# Patient Record
Sex: MALE | Race: WHITE | NOT HISPANIC OR LATINO | URBAN - METROPOLITAN AREA
[De-identification: names, ages, dates, MRNs, and addresses within clinical notes are randomized per-mention and may not be internally consistent; named-entity substitution may affect disease eponyms.]

---
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
## 2025-02-28 ENCOUNTER — OFFICE VISIT (OUTPATIENT)
Dept: FAMILY MEDICINE | Facility: CLINIC | Age: 25
End: 2025-02-28
Payer: OTHER GOVERNMENT

## 2025-02-28 VITALS
DIASTOLIC BLOOD PRESSURE: 84 MMHG | HEIGHT: 76 IN | HEART RATE: 92 BPM | SYSTOLIC BLOOD PRESSURE: 150 MMHG | WEIGHT: 218 LBS | BODY MASS INDEX: 26.55 KG/M2 | OXYGEN SATURATION: 99 % | TEMPERATURE: 99 F

## 2025-02-28 DIAGNOSIS — Z76.89 ENCOUNTER TO ESTABLISH CARE: Primary | ICD-10-CM

## 2025-02-28 DIAGNOSIS — I10 PRIMARY HYPERTENSION: ICD-10-CM

## 2025-02-28 DIAGNOSIS — F41.9 ANXIETY: ICD-10-CM

## 2025-02-28 PROCEDURE — 99203 OFFICE O/P NEW LOW 30 MIN: CPT | Mod: PBBFAC,PN

## 2025-02-28 PROCEDURE — 99999 PR PBB SHADOW E&M-NEW PATIENT-LVL III: CPT | Mod: PBBFAC,,,

## 2025-02-28 RX ORDER — SERTRALINE HYDROCHLORIDE 25 MG/1
25 TABLET, FILM COATED ORAL DAILY
Qty: 30 TABLET | Refills: 1 | Status: SHIPPED | OUTPATIENT
Start: 2025-02-28

## 2025-02-28 RX ORDER — LISINOPRIL 10 MG/1
10 TABLET ORAL DAILY
COMMUNITY
End: 2025-02-28 | Stop reason: SINTOL

## 2025-02-28 RX ORDER — AMLODIPINE BESYLATE 5 MG/1
5 TABLET ORAL DAILY
Qty: 30 TABLET | Refills: 1 | Status: SHIPPED | OUTPATIENT
Start: 2025-02-28

## 2025-02-28 NOTE — PROGRESS NOTES
SUBJECTIVE:      Patient ID: Mc Vasques is a 24 y.o. male.    Chief Complaint: Follow-up (Bp follow up and Med Refill)    History of Present Illness    CHIEF COMPLAINT:  Mc presents to establish care and obtain refills for blood pressure medication.    HPI:  Mc reports a history of hypertension diagnosed last year. He was previously prescribed Lisinopril 10 mg daily but ran out in the first week of January and has not been taking it since. He has anxiety throughout the day, which he believes contributes to his high blood pressure. His anxiety worsens in when alone and improves when with familiar people. He was previously given Ativan 1 mg for anxiety but is not currently taking it. He had severe dry cough as a side effect of Lisinopril. He has made lifestyle changes to manage his blood pressure, including weight loss, diet plans, and using salt substitutes. He exercises regularly and avoids soda, limiting himself to 1 Powerade per day due to its high sodium content. He also avoids caffeine as it causes jitteriness.    He denies SI, HI, or SH. Reports that the anxiety does interfere with his daily life and would like to start medications. He currently is not in counseling. 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     He denies chest pain, chest tightness, shortness of breath, nausea, vomiting, diarrhea, loose stools, changes in bowel movements, difficulty urinating, joint pain, and muscle pain. He denies any medical history other than hypertension and anxiety.    MEDICATIONS:  Mc started Lisinopril 10 mg daily for hypertension, last taken in the first week of January. He experienced a side effect of severe dry cough, which led to the discontinuation of this medication. He was previously given Ativan 1 mg for anxiety but is not currently taking it.    MEDICAL HISTORY:  Mc has a history of hypertension, diagnosed last year, and anxiety.    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 These included a stress test, EKG, Holter monitor, and nuclear stress test, all of which yielded normal results. Echocardiogram: Last year, normal        Follow-up  Pertinent negatives include no abdominal pain, arthralgias, chest pain, chills, congestion, coughing, fatigue, fever, headaches, myalgias, nausea, rash, sore throat, vomiting or weakness.      Review of Systems   Constitutional:  Negative for appetite change, chills, fatigue, fever and unexpected weight change.   HENT:  Negative for congestion, ear pain and sore throat.    Eyes:  Negative for photophobia, pain and visual disturbance.   Respiratory:  Negative for cough, shortness of breath and wheezing.    Cardiovascular:  Negative for chest pain, palpitations and leg swelling.   Gastrointestinal:  Negative for abdominal pain, constipation, diarrhea, nausea and vomiting.   Endocrine: Negative for cold intolerance, heat intolerance and polydipsia.   Genitourinary:  Negative for difficulty urinating, dysuria and hematuria.   Musculoskeletal:  Negative for arthralgias and myalgias.   Skin:  Negative for rash.   Neurological:  Negative for dizziness, syncope, weakness and headaches.   Hematological:  Negative for adenopathy. Does not bruise/bleed easily.   Psychiatric/Behavioral:  Negative for dysphoric mood, self-injury and suicidal ideas. The patient is nervous/anxious.        No past medical history on file.  Current Medications[1]  Review of patient's allergies indicates:  No Known Allergies   No past surgical history on file.  Social History     Socioeconomic History    Marital status: Single     No family history on file.       OBJECTIVE:      Vitals:    02/28/25 0854   BP: (!) 150/84   BP Location: Right arm   Patient Position: Sitting   Pulse: 92  "  Temp: 98.9 °F (37.2 °C)   TempSrc: Oral   SpO2: 99%   Weight: 98.9 kg (218 lb)   Height: 6' 4" (1.93 m)     Physical Exam  Vitals and nursing note reviewed.   Constitutional:       General: He is not in acute distress.     Appearance: Normal appearance. He is well-developed. He is not ill-appearing or toxic-appearing.   HENT:      Head: Normocephalic and atraumatic.      Right Ear: Tympanic membrane, ear canal and external ear normal. There is no impacted cerumen.      Left Ear: Tympanic membrane, ear canal and external ear normal. There is no impacted cerumen.      Nose: Nose normal. No congestion or rhinorrhea.      Mouth/Throat:      Mouth: Mucous membranes are moist.      Pharynx: Oropharynx is clear. No oropharyngeal exudate or posterior oropharyngeal erythema.   Eyes:      General: No scleral icterus.        Right eye: No discharge.         Left eye: No discharge.      Extraocular Movements: Extraocular movements intact.      Conjunctiva/sclera: Conjunctivae normal.      Pupils: Pupils are equal, round, and reactive to light.   Neck:      Thyroid: No thyroid mass or thyromegaly.      Trachea: Trachea normal.   Cardiovascular:      Rate and Rhythm: Normal rate and regular rhythm.      Pulses: Normal pulses.      Heart sounds: Normal heart sounds. No murmur heard.  Pulmonary:      Effort: Pulmonary effort is normal. No respiratory distress.      Breath sounds: Normal breath sounds. No wheezing.   Musculoskeletal:         General: Normal range of motion.      Cervical back: Normal range of motion and neck supple. No tenderness.      Right lower leg: No edema.      Left lower leg: No edema.   Lymphadenopathy:      Cervical: No cervical adenopathy.   Skin:     General: Skin is warm and dry.      Coloration: Skin is not pale.      Findings: No erythema or rash.   Neurological:      Mental Status: He is alert and oriented to person, place, and time.   Psychiatric:         Mood and Affect: Mood is anxious.         " Behavior: Behavior normal.         Thought Content: Thought content does not include homicidal or suicidal ideation. Thought content does not include homicidal or suicidal plan.         Cognition and Memory: Cognition normal.         Judgment: Judgment normal.            Assessment:       1. Encounter to establish care    2. Primary hypertension    3. Anxiety        Plan:       Assessment & Plan    - Assessed 24-year-old patient with hypertension and anxiety  - Considered need to rule out renal causes of hypertension due to patient's age  - Evaluated need for anxiety medication; decided to start SSRI (Zoloft) at low dose  - Switched from Lisinopril to amlodipine due to patient's reported dry cough side effect  - Ordered comprehensive lab panel to evaluate overall health status and potential underlying causes of hypertension    PLAN SUMMARY:  - Discontinue Lisinopril due to adverse effects  - Prescribe amlodipine (calcium channel blocker) for hypertension management  - Prescribe Zoloft 25 mg daily for anxiety management  - Order renal arterial ultrasound to rule out kidney-related causes of hypertension  - Order labs: kidney function, liver function, anemia, thyroid function, cholesterol levels, and electrolytes  - Follow-up appointment in 1 month to evaluate anxiety medication effectiveness, check blood pressure, and review lab results  - Potential increase of Zoloft to 50 mg at follow-up if needed    I10 ESSENTIAL (PRIMARY) HYPERTENSION:  - Assessed the patient's blood pressure, which is elevated at 150/84.  - Noted the patient's history of hypertension diagnosed last year and current non-adherence to medication since early January.  - Explained potential causes of hypertension, including renal, cardiac, dietary, and psychosomatic factors.  - Discussed blood pressure goals (below 130/80).  - Prescribed amlodipine, a calcium channel blocker, to replace Lisinopril for hypertension management.  - Ordered a renal  arterial ultrasound to rule out kidney-related causes of hypertension due to the patient's young age.  - Planned labs to check kidney function, liver function, anemia, thyroid function, and cholesterol levels.  - Scheduled a follow-up visit in 1 month to check blood pressure and review lab results.    F41.9 ANXIETY DISORDER, UNSPECIFIED:  - Evaluated the patient's ongoing anxiety throughout the day, including social anxiety when going out.  - Informed the patient about the gradual onset of SSRI effects (2 weeks for initial effects, 4-6 weeks for full effect).  - Prescribed Zoloft 25 mg daily for anxiety management, with the possibility of increasing to 50 mg at follow-up if needed.  - Scheduled a follow-up visit in 1 month to evaluate the effectiveness of anxiety medication.  - Instructed the patient to contact the office immediately if experiencing suicidal thoughts, homicidal thoughts, or self-harm urges.         Encounter to establish care    Primary hypertension  -     CBC Auto Differential; Future; Expected date: 02/28/2025  -     Comprehensive Metabolic Panel; Future; Expected date: 02/28/2025  -     TSH; Future; Expected date: 02/28/2025  -     Lipid Panel; Future; Expected date: 02/28/2025  -     US Renal Artery Stenosis Hyperten (xpd); Future; Expected date: 02/28/2025  -     amLODIPine (NORVASC) 5 MG tablet; Take 1 tablet (5 mg total) by mouth once daily.  Dispense: 30 tablet; Refill: 1    Anxiety  -     CBC Auto Differential; Future; Expected date: 02/28/2025  -     Comprehensive Metabolic Panel; Future; Expected date: 02/28/2025  -     TSH; Future; Expected date: 02/28/2025  -     sertraline (ZOLOFT) 25 MG tablet; Take 1 tablet (25 mg total) by mouth once daily.  Dispense: 30 tablet; Refill: 1        Follow up in about 1 month (around 3/28/2025) for HTN, LABS, anxiety.      2/28/2025 DENNIS Myrick, RHIANNON  This note was generated with the assistance of ambient listening technology. Verbal consent  was obtained by the patient and accompanying visitor(s) for the recording of patient appointment to facilitate this note. I attest to having reviewed and edited the generated note for accuracy, though some syntax or spelling errors may persist. Please contact the author of this note for any clarification.              [1]   Current Outpatient Medications   Medication Sig Dispense Refill    amLODIPine (NORVASC) 5 MG tablet Take 1 tablet (5 mg total) by mouth once daily. 30 tablet 1    sertraline (ZOLOFT) 25 MG tablet Take 1 tablet (25 mg total) by mouth once daily. 30 tablet 1     No current facility-administered medications for this visit.

## 2025-03-17 ENCOUNTER — TELEPHONE (OUTPATIENT)
Dept: FAMILY MEDICINE | Facility: CLINIC | Age: 25
End: 2025-03-17
Payer: OTHER GOVERNMENT

## 2025-03-31 ENCOUNTER — OFFICE VISIT (OUTPATIENT)
Dept: FAMILY MEDICINE | Facility: CLINIC | Age: 25
End: 2025-03-31
Payer: OTHER GOVERNMENT

## 2025-03-31 VITALS
DIASTOLIC BLOOD PRESSURE: 88 MMHG | OXYGEN SATURATION: 98 % | TEMPERATURE: 99 F | WEIGHT: 226.88 LBS | SYSTOLIC BLOOD PRESSURE: 138 MMHG | HEART RATE: 97 BPM | BODY MASS INDEX: 27.61 KG/M2

## 2025-03-31 DIAGNOSIS — F41.9 ANXIETY: ICD-10-CM

## 2025-03-31 DIAGNOSIS — I10 PRIMARY HYPERTENSION: ICD-10-CM

## 2025-03-31 PROCEDURE — 99214 OFFICE O/P EST MOD 30 MIN: CPT | Mod: S$PBB,,,

## 2025-03-31 PROCEDURE — 99213 OFFICE O/P EST LOW 20 MIN: CPT | Mod: PBBFAC,PN

## 2025-03-31 PROCEDURE — 99999 PR PBB SHADOW E&M-EST. PATIENT-LVL III: CPT | Mod: PBBFAC,,,

## 2025-03-31 RX ORDER — AMLODIPINE BESYLATE 5 MG/1
5 TABLET ORAL DAILY
Qty: 90 TABLET | Refills: 3 | Status: SHIPPED | OUTPATIENT
Start: 2025-03-31

## 2025-03-31 RX ORDER — SERTRALINE HYDROCHLORIDE 25 MG/1
25 TABLET, FILM COATED ORAL DAILY
Qty: 90 TABLET | Refills: 3 | Status: SHIPPED | OUTPATIENT
Start: 2025-03-31

## 2025-03-31 NOTE — PROGRESS NOTES
SUBJECTIVE:      Patient ID: Mc Vasques is a 25 y.o. male.    Chief Complaint: Follow-up and Hypertension    History of Present Illness    CHIEF COMPLAINT:  Mc presents for a follow-up visit to discuss hypertension management and anxiety treatment.    HPI:  Mc presents today to follow up on hypertension and anxiety.  He reports improved blood pressure control with the current medication regimen. He has been monitoring his blood pressure regularly, but forgot to bring his list of readings to the appointment.  Blood pressure today was 138/88, last office visit was 150/84.  He is currently taking amlodipine 5 mg daily. Tolerating medications well and denies side effects.  Denies chest pain, chest tightness, SOB, orthopnea, peripheral edema or headaches. He has been adhering to lifestyle modifications, including watching his sodium intake and avoiding sodas.    Mc reports that his social anxiety is well-controlled on the current dose of Zoloft (25 mg daily), with no breakthrough anxiety.   Denies suicidal, homicidal, or self-harming thoughts.     Labs are pending to be completed along with a renal ultrasound.    MEDICATIONS:  Mc is on Amlodipine 5 mg daily for hypertension, which has been effective in lowering blood pressure without any reported side effects. He is also taking Zoloft (Sertraline) 25 mg daily for social anxiety, which has been effective in controlling anxiety symptoms without any reported side effects.    MEDICAL HISTORY:  Mc has a history of hypertension and social anxiety.    SOCIAL HISTORY:  Occupation: Works with nurses     History: Mc has  history        HPI   Review of Systems   Constitutional:  Negative for appetite change, chills, fatigue, fever and unexpected weight change.   HENT:  Negative for congestion, ear pain and sore throat.    Eyes:  Negative for photophobia, pain and visual disturbance.   Respiratory:  Negative for cough,  shortness of breath and wheezing.    Cardiovascular:  Negative for chest pain, palpitations and leg swelling.   Gastrointestinal:  Negative for abdominal pain, constipation, diarrhea, nausea and vomiting.   Endocrine: Negative for cold intolerance, heat intolerance and polydipsia.   Genitourinary:  Negative for difficulty urinating, dysuria and hematuria.   Musculoskeletal:  Negative for arthralgias and myalgias.   Skin:  Negative for rash.   Neurological:  Negative for dizziness, syncope, weakness and headaches.   Hematological:  Negative for adenopathy. Does not bruise/bleed easily.   Psychiatric/Behavioral:  Negative for dysphoric mood. The patient is not nervous/anxious.        No past medical history on file.  Current Medications[1]  Review of patient's allergies indicates:  No Known Allergies   No past surgical history on file.  Social History     Socioeconomic History    Marital status: Single   Tobacco Use    Smoking status: Former     Types: Cigarettes     No family history on file.       OBJECTIVE:      Vitals:    03/31/25 1255   BP: 138/88   BP Location: Left arm   Patient Position: Sitting   Pulse: 97   Temp: 98.7 °F (37.1 °C)   TempSrc: Oral   SpO2: 98%   Weight: 102.9 kg (226 lb 13.7 oz)     Physical Exam  Vitals and nursing note reviewed.   Constitutional:       General: He is not in acute distress.     Appearance: Normal appearance. He is well-developed. He is not ill-appearing or toxic-appearing.   HENT:      Head: Normocephalic and atraumatic.      Right Ear: External ear normal.      Left Ear: External ear normal.      Nose: Nose normal. No congestion or rhinorrhea.      Mouth/Throat:      Mouth: Mucous membranes are moist.      Pharynx: Oropharynx is clear. No oropharyngeal exudate or posterior oropharyngeal erythema.   Eyes:      General: No scleral icterus.        Right eye: No discharge.         Left eye: No discharge.      Extraocular Movements: Extraocular movements intact.       Conjunctiva/sclera: Conjunctivae normal.      Pupils: Pupils are equal, round, and reactive to light.   Neck:      Thyroid: No thyroid mass or thyromegaly.      Trachea: Trachea normal.   Cardiovascular:      Rate and Rhythm: Normal rate and regular rhythm.      Pulses: Normal pulses.      Heart sounds: Normal heart sounds. No murmur heard.  Pulmonary:      Effort: Pulmonary effort is normal. No respiratory distress.      Breath sounds: Normal breath sounds. No wheezing.   Musculoskeletal:         General: Normal range of motion.      Cervical back: Normal range of motion and neck supple. No tenderness.      Right lower leg: No edema.      Left lower leg: No edema.   Lymphadenopathy:      Cervical: No cervical adenopathy.   Skin:     General: Skin is warm and dry.      Coloration: Skin is not pale.      Findings: No erythema or rash.   Neurological:      Mental Status: He is alert and oriented to person, place, and time.   Psychiatric:         Attention and Perception: Attention normal.         Mood and Affect: Mood normal. Mood is not anxious.         Speech: Speech normal.         Behavior: Behavior normal. Behavior is cooperative.         Thought Content: Thought content normal. Thought content does not include homicidal or suicidal ideation. Thought content does not include homicidal or suicidal plan.         Cognition and Memory: Cognition normal.         Judgment: Judgment normal.            Assessment:       1. Primary hypertension    2. Anxiety        Plan:       Assessment & Plan    - HTN control improved to 138/88 with current amlodipine 5 mg daily regimen.  - Zoloft 25 mg daily effective for social anxiety control without breakthrough symptoms.  - Maintained current medication regimens for HTN and anxiety given positive response and absence of side effects.    PLAN SUMMARY:  - Maintain amlodipine 5 mg daily for hypertension  - Continue Zoloft 25 mg daily for anxiety  - renal ultrasound and fasting labs  are still pending  - Follow-up in 3 months for anxiety and hypertension, with potential to extend to 6 months if controlled    I10 ESSENTIAL (PRIMARY) HYPERTENSION:  - Measured the patient's blood pressure at 138/88, noting an improvement from previous readings.  - Evaluated the patient's tolerance to amlodipine 5 mg, checking for side effects such as chest pain, chest tightness, dyspnea, dizziness, and extremity edema.  - Assessed the patient's sodium intake and avoidance of sodas as part of hypertension management.  - Ordered renal ultrasound and fasting labs for further evaluation.  - Maintained the patient on amlodipine 5 mg daily for hypertension management.  - Advised against daily blood pressure checks unless experiencing symptoms like dizziness or headaches.  - Recommend continued monitoring of caffeine and sodium intake.  - Instructed the patient to contact the office when labs and renal ultrasound are completed.  - Scheduled follow up in 3 months for hypertension, with potential to extend to 6 months if controlled.    F41.1 GENERALIZED ANXIETY DISORDER:  - Inquired about the patient's social anxiety and overall anxiety control.  - Evaluated the patient's response to Zoloft 25 mg daily, checking for effectiveness and side effects.  - Maintained the patient on Zoloft 25 mg daily for anxiety management.  - Scheduled follow up in 3 months for anxiety, with potential to extend to 6 months if controlled.         Primary hypertension  -controlled  -continue amlodipine 5 mg daily  -continue limiting sodium and caffeine intake  -continue increasing physical exercise  -encouraged to get labs completed  -     amLODIPine (NORVASC) 5 MG tablet; Take 1 tablet (5 mg total) by mouth once daily.  Dispense: 90 tablet; Refill: 3    Anxiety  -controlled  -continue Zoloft 25 mg daily  -     sertraline (ZOLOFT) 25 MG tablet; Take 1 tablet (25 mg total) by mouth once daily.  Dispense: 90 tablet; Refill: 3        Follow up in  about 3 months (around 6/30/2025) for HTN and anxiety.      3/31/2025 DENNIS Myrick, RHIANNON  This note was generated with the assistance of ambient listening technology. Verbal consent was obtained by the patient and accompanying visitor(s) for the recording of patient appointment to facilitate this note. I attest to having reviewed and edited the generated note for accuracy, though some syntax or spelling errors may persist. Please contact the author of this note for any clarification.              [1]   Current Outpatient Medications   Medication Sig Dispense Refill    fluticasone (FLONASE) 50 mcg/actuation nasal spray 1 spray (50 mcg total) by Each Nare route once daily. 9.9 mL 0    amLODIPine (NORVASC) 5 MG tablet Take 1 tablet (5 mg total) by mouth once daily. 90 tablet 3    cetirizine (ZYRTEC) 10 MG tablet Take 1 tablet (10 mg total) by mouth once daily. 30 tablet 1    sertraline (ZOLOFT) 25 MG tablet Take 1 tablet (25 mg total) by mouth once daily. 90 tablet 3     No current facility-administered medications for this visit.

## 2025-06-18 ENCOUNTER — TELEPHONE (OUTPATIENT)
Dept: FAMILY MEDICINE | Facility: CLINIC | Age: 25
End: 2025-06-18
Payer: OTHER GOVERNMENT

## 2025-06-26 ENCOUNTER — TELEPHONE (OUTPATIENT)
Dept: FAMILY MEDICINE | Facility: CLINIC | Age: 25
End: 2025-06-26
Payer: OTHER GOVERNMENT

## 2025-07-28 ENCOUNTER — HOSPITAL ENCOUNTER (INPATIENT)
Facility: HOSPITAL | Age: 25
LOS: 1 days | Discharge: HOME | DRG: 310 | End: 2025-07-30
Attending: STUDENT IN AN ORGANIZED HEALTH CARE EDUCATION/TRAINING PROGRAM | Admitting: FAMILY MEDICINE
Payer: OTHER GOVERNMENT

## 2025-07-28 DIAGNOSIS — I48.91 ATRIAL FIBRILLATION WITH RVR: ICD-10-CM

## 2025-07-28 DIAGNOSIS — R00.2 PALPITATIONS: ICD-10-CM

## 2025-07-28 DIAGNOSIS — I48.91 A-FIB: ICD-10-CM

## 2025-07-28 DIAGNOSIS — I48.0 PAF (PAROXYSMAL ATRIAL FIBRILLATION): Primary | ICD-10-CM

## 2025-07-28 DIAGNOSIS — I49.9 ARRHYTHMIA: ICD-10-CM

## 2025-07-28 DIAGNOSIS — R07.9 CHEST PAIN: ICD-10-CM

## 2025-07-28 DIAGNOSIS — I48.92 ATRIAL FIB/FLUTTER, TRANSIENT: ICD-10-CM

## 2025-07-28 DIAGNOSIS — I48.91 ATRIAL FIB/FLUTTER, TRANSIENT: ICD-10-CM

## 2025-07-28 LAB
ABSOLUTE EOSINOPHIL (SMH): 0.3 K/UL
ABSOLUTE MONOCYTE (SMH): 0.78 K/UL (ref 0.3–1)
ABSOLUTE NEUTROPHIL COUNT (SMH): 4 K/UL (ref 1.8–7.7)
ALBUMIN SERPL-MCNC: 5.3 G/DL (ref 3.5–5.2)
ALP SERPL-CCNC: 69 UNIT/L (ref 55–135)
ALT SERPL-CCNC: 18 UNIT/L (ref 10–44)
ANION GAP (SMH): 8 MMOL/L (ref 8–16)
AST SERPL-CCNC: 20 UNIT/L (ref 10–40)
BASOPHILS # BLD AUTO: 0.09 K/UL
BASOPHILS NFR BLD AUTO: 1 %
BILIRUB SERPL-MCNC: 0.7 MG/DL (ref 0.1–1)
BNP SERPL-MCNC: 8 PG/ML
BUN SERPL-MCNC: 15 MG/DL (ref 6–20)
CALCIUM SERPL-MCNC: 9.4 MG/DL (ref 8.7–10.5)
CHLORIDE SERPL-SCNC: 104 MMOL/L (ref 95–110)
CO2 SERPL-SCNC: 26 MMOL/L (ref 23–29)
CREAT SERPL-MCNC: 1 MG/DL (ref 0.5–1.4)
D DIMER PPP IA.FEU-MCNC: 0.3 MG/L FEU
ERYTHROCYTE [DISTWIDTH] IN BLOOD BY AUTOMATED COUNT: 12.6 % (ref 11.5–14.5)
GFR SERPLBLD CREATININE-BSD FMLA CKD-EPI: >60 ML/MIN/1.73/M2
GLUCOSE SERPL-MCNC: 117 MG/DL (ref 70–110)
HCT VFR BLD AUTO: 47.6 % (ref 40–54)
HGB BLD-MCNC: 16.5 GM/DL (ref 14–18)
IMM GRANULOCYTES # BLD AUTO: 0.03 K/UL (ref 0–0.04)
IMM GRANULOCYTES NFR BLD AUTO: 0.3 % (ref 0–0.5)
INR PPP: 1 (ref 0.8–1.2)
LYMPHOCYTES # BLD AUTO: 3.62 K/UL (ref 1–4.8)
MAGNESIUM SERPL-MCNC: 2.1 MG/DL (ref 1.6–2.6)
MCH RBC QN AUTO: 29.4 PG (ref 27–31)
MCHC RBC AUTO-ENTMCNC: 34.7 G/DL (ref 32–36)
MCV RBC AUTO: 85 FL (ref 82–98)
NUCLEATED RBC (/100WBC) (SMH): 0 /100 WBC
PLATELET # BLD AUTO: 302 K/UL (ref 150–450)
PMV BLD AUTO: 9.6 FL (ref 9.2–12.9)
POTASSIUM SERPL-SCNC: 4 MMOL/L (ref 3.5–5.1)
PROT SERPL-MCNC: 8.3 GM/DL (ref 6–8.4)
PROTHROMBIN TIME: 10.9 SECONDS (ref 9–12.5)
RBC # BLD AUTO: 5.61 M/UL (ref 4.6–6.2)
RELATIVE EOSINOPHIL (SMH): 3.4 % (ref 0–8)
RELATIVE LYMPHOCYTE (SMH): 41 % (ref 18–48)
RELATIVE MONOCYTE (SMH): 8.8 % (ref 4–15)
RELATIVE NEUTROPHIL (SMH): 45.5 % (ref 38–73)
SODIUM SERPL-SCNC: 138 MMOL/L (ref 136–145)
TROPONIN HIGH SENSITIVE (SMH): 2.6 PG/ML
TSH SERPL-ACNC: 1.23 UIU/ML (ref 0.34–5.6)
WBC # BLD AUTO: 8.83 K/UL (ref 3.9–12.7)

## 2025-07-28 PROCEDURE — 87389 HIV-1 AG W/HIV-1&-2 AB AG IA: CPT | Performed by: STUDENT IN AN ORGANIZED HEALTH CARE EDUCATION/TRAINING PROGRAM

## 2025-07-28 PROCEDURE — 96376 TX/PRO/DX INJ SAME DRUG ADON: CPT

## 2025-07-28 PROCEDURE — 63600175 PHARM REV CODE 636 W HCPCS

## 2025-07-28 PROCEDURE — 96375 TX/PRO/DX INJ NEW DRUG ADDON: CPT

## 2025-07-28 PROCEDURE — 83880 ASSAY OF NATRIURETIC PEPTIDE: CPT | Performed by: STUDENT IN AN ORGANIZED HEALTH CARE EDUCATION/TRAINING PROGRAM

## 2025-07-28 PROCEDURE — 84443 ASSAY THYROID STIM HORMONE: CPT | Performed by: STUDENT IN AN ORGANIZED HEALTH CARE EDUCATION/TRAINING PROGRAM

## 2025-07-28 PROCEDURE — G0378 HOSPITAL OBSERVATION PER HR: HCPCS

## 2025-07-28 PROCEDURE — 96365 THER/PROPH/DIAG IV INF INIT: CPT

## 2025-07-28 PROCEDURE — 96372 THER/PROPH/DIAG INJ SC/IM: CPT

## 2025-07-28 PROCEDURE — 86803 HEPATITIS C AB TEST: CPT | Performed by: STUDENT IN AN ORGANIZED HEALTH CARE EDUCATION/TRAINING PROGRAM

## 2025-07-28 PROCEDURE — 85610 PROTHROMBIN TIME: CPT | Performed by: STUDENT IN AN ORGANIZED HEALTH CARE EDUCATION/TRAINING PROGRAM

## 2025-07-28 PROCEDURE — 93010 ELECTROCARDIOGRAM REPORT: CPT | Mod: ,,, | Performed by: GENERAL PRACTICE

## 2025-07-28 PROCEDURE — 25000003 PHARM REV CODE 250

## 2025-07-28 PROCEDURE — 83735 ASSAY OF MAGNESIUM: CPT | Performed by: STUDENT IN AN ORGANIZED HEALTH CARE EDUCATION/TRAINING PROGRAM

## 2025-07-28 PROCEDURE — 93010 ELECTROCARDIOGRAM REPORT: CPT | Mod: 76,,, | Performed by: GENERAL PRACTICE

## 2025-07-28 PROCEDURE — 93005 ELECTROCARDIOGRAM TRACING: CPT | Performed by: GENERAL PRACTICE

## 2025-07-28 PROCEDURE — 80053 COMPREHEN METABOLIC PANEL: CPT | Performed by: STUDENT IN AN ORGANIZED HEALTH CARE EDUCATION/TRAINING PROGRAM

## 2025-07-28 PROCEDURE — 99285 EMERGENCY DEPT VISIT HI MDM: CPT | Mod: 25

## 2025-07-28 PROCEDURE — 96366 THER/PROPH/DIAG IV INF ADDON: CPT

## 2025-07-28 PROCEDURE — 63600175 PHARM REV CODE 636 W HCPCS: Performed by: STUDENT IN AN ORGANIZED HEALTH CARE EDUCATION/TRAINING PROGRAM

## 2025-07-28 PROCEDURE — 85379 FIBRIN DEGRADATION QUANT: CPT | Performed by: STUDENT IN AN ORGANIZED HEALTH CARE EDUCATION/TRAINING PROGRAM

## 2025-07-28 PROCEDURE — 85025 COMPLETE CBC W/AUTO DIFF WBC: CPT | Performed by: STUDENT IN AN ORGANIZED HEALTH CARE EDUCATION/TRAINING PROGRAM

## 2025-07-28 PROCEDURE — 84484 ASSAY OF TROPONIN QUANT: CPT | Performed by: STUDENT IN AN ORGANIZED HEALTH CARE EDUCATION/TRAINING PROGRAM

## 2025-07-28 RX ORDER — LANOLIN ALCOHOL/MO/W.PET/CERES
800 CREAM (GRAM) TOPICAL
Status: DISCONTINUED | OUTPATIENT
Start: 2025-07-29 | End: 2025-07-30 | Stop reason: HOSPADM

## 2025-07-28 RX ORDER — NALOXONE HCL 0.4 MG/ML
0.02 VIAL (ML) INJECTION
Status: DISCONTINUED | OUTPATIENT
Start: 2025-07-29 | End: 2025-07-30 | Stop reason: HOSPADM

## 2025-07-28 RX ORDER — SODIUM,POTASSIUM PHOSPHATES 280-250MG
2 POWDER IN PACKET (EA) ORAL
Status: DISCONTINUED | OUTPATIENT
Start: 2025-07-29 | End: 2025-07-30 | Stop reason: HOSPADM

## 2025-07-28 RX ORDER — ONDANSETRON HYDROCHLORIDE 2 MG/ML
4 INJECTION, SOLUTION INTRAVENOUS EVERY 6 HOURS PRN
Status: DISCONTINUED | OUTPATIENT
Start: 2025-07-29 | End: 2025-07-30 | Stop reason: HOSPADM

## 2025-07-28 RX ORDER — GLUCAGON 1 MG
1 KIT INJECTION
Status: DISCONTINUED | OUTPATIENT
Start: 2025-07-29 | End: 2025-07-30 | Stop reason: HOSPADM

## 2025-07-28 RX ORDER — IBUPROFEN 200 MG
16 TABLET ORAL
Status: DISCONTINUED | OUTPATIENT
Start: 2025-07-29 | End: 2025-07-30 | Stop reason: HOSPADM

## 2025-07-28 RX ORDER — DILTIAZEM HYDROCHLORIDE 5 MG/ML
INJECTION INTRAVENOUS
Status: COMPLETED
Start: 2025-07-28 | End: 2025-07-28

## 2025-07-28 RX ORDER — ALUMINUM HYDROXIDE, MAGNESIUM HYDROXIDE, AND SIMETHICONE 1200; 120; 1200 MG/30ML; MG/30ML; MG/30ML
30 SUSPENSION ORAL 4 TIMES DAILY PRN
Status: DISCONTINUED | OUTPATIENT
Start: 2025-07-29 | End: 2025-07-30 | Stop reason: HOSPADM

## 2025-07-28 RX ORDER — MAGNESIUM SULFATE HEPTAHYDRATE 40 MG/ML
INJECTION, SOLUTION INTRAVENOUS
Status: DISPENSED
Start: 2025-07-28 | End: 2025-07-29

## 2025-07-28 RX ORDER — ENOXAPARIN SODIUM 100 MG/ML
1 INJECTION SUBCUTANEOUS EVERY 12 HOURS
Status: DISCONTINUED | OUTPATIENT
Start: 2025-07-29 | End: 2025-07-30 | Stop reason: HOSPADM

## 2025-07-28 RX ORDER — DILTIAZEM HYDROCHLORIDE 5 MG/ML
10 INJECTION INTRAVENOUS
Status: COMPLETED | OUTPATIENT
Start: 2025-07-28 | End: 2025-07-28

## 2025-07-28 RX ORDER — TALC
6 POWDER (GRAM) TOPICAL NIGHTLY PRN
Status: DISCONTINUED | OUTPATIENT
Start: 2025-07-29 | End: 2025-07-30 | Stop reason: HOSPADM

## 2025-07-28 RX ORDER — SODIUM CHLORIDE 0.9 % (FLUSH) 0.9 %
10 SYRINGE (ML) INJECTION EVERY 12 HOURS PRN
Status: DISCONTINUED | OUTPATIENT
Start: 2025-07-29 | End: 2025-07-30 | Stop reason: HOSPADM

## 2025-07-28 RX ORDER — ACETAMINOPHEN 325 MG/1
650 TABLET ORAL EVERY 4 HOURS PRN
Status: DISCONTINUED | OUTPATIENT
Start: 2025-07-29 | End: 2025-07-30 | Stop reason: HOSPADM

## 2025-07-28 RX ORDER — AMOXICILLIN 250 MG
1 CAPSULE ORAL 2 TIMES DAILY PRN
Status: DISCONTINUED | OUTPATIENT
Start: 2025-07-29 | End: 2025-07-30 | Stop reason: HOSPADM

## 2025-07-28 RX ORDER — MAGNESIUM SULFATE HEPTAHYDRATE 40 MG/ML
2 INJECTION, SOLUTION INTRAVENOUS ONCE
Status: COMPLETED | OUTPATIENT
Start: 2025-07-28 | End: 2025-07-28

## 2025-07-28 RX ORDER — IBUPROFEN 200 MG
24 TABLET ORAL
Status: DISCONTINUED | OUTPATIENT
Start: 2025-07-29 | End: 2025-07-30 | Stop reason: HOSPADM

## 2025-07-28 RX ADMIN — DILTIAZEM HYDROCHLORIDE 5 MG/HR: 100 INJECTION, POWDER, LYOPHILIZED, FOR SOLUTION INTRAVENOUS at 11:07

## 2025-07-28 RX ADMIN — DILTIAZEM HYDROCHLORIDE 10 MG: 5 INJECTION INTRAVENOUS at 08:07

## 2025-07-28 RX ADMIN — ENOXAPARIN SODIUM 100 MG: 100 INJECTION SUBCUTANEOUS at 11:07

## 2025-07-28 RX ADMIN — MAGNESIUM SULFATE HEPTAHYDRATE 2 G: 40 INJECTION, SOLUTION INTRAVENOUS at 08:07

## 2025-07-28 RX ADMIN — DILTIAZEM HYDROCHLORIDE 10 MG: 5 INJECTION INTRAVENOUS at 09:07

## 2025-07-28 RX ADMIN — SODIUM CHLORIDE, POTASSIUM CHLORIDE, SODIUM LACTATE AND CALCIUM CHLORIDE 500 ML: 600; 310; 30; 20 INJECTION, SOLUTION INTRAVENOUS at 08:07

## 2025-07-29 ENCOUNTER — CLINICAL SUPPORT (OUTPATIENT)
Dept: CARDIOLOGY | Facility: HOSPITAL | Age: 25
End: 2025-07-29
Attending: STUDENT IN AN ORGANIZED HEALTH CARE EDUCATION/TRAINING PROGRAM
Payer: OTHER GOVERNMENT

## 2025-07-29 VITALS — WEIGHT: 220 LBS | BODY MASS INDEX: 26.79 KG/M2 | HEIGHT: 76 IN

## 2025-07-29 LAB
ABSOLUTE EOSINOPHIL (SMH): 0.22 K/UL
ABSOLUTE MONOCYTE (SMH): 0.78 K/UL (ref 0.3–1)
ABSOLUTE NEUTROPHIL COUNT (SMH): 4.4 K/UL (ref 1.8–7.7)
AMPHET UR QL SCN: NEGATIVE
ANION GAP (SMH): 14 MMOL/L (ref 8–16)
AORTIC ROOT ANNULUS: 3.5 CM
AORTIC SIZE INDEX: 1.2 CM/M2
AORTIC VALVE CUSP SEPERATION: 2.5 CM
APICAL FOUR CHAMBER EJECTION FRACTION: 60 %
APICAL TWO CHAMBER EJECTION FRACTION: 57 %
ASCENDING AORTA: 2.7 CM
AV INDEX (PROSTH): 0.65
AV MEAN GRADIENT: 3 MMHG
AV PEAK GRADIENT: 6 MMHG
AV VALVE AREA BY VELOCITY RATIO: 3.4 CM²
AV VALVE AREA: 3 CM²
AV VELOCITY RATIO: 0.75
BARBITURATE SCN PRESENT UR: NEGATIVE
BASOPHILS # BLD AUTO: 0.12 K/UL
BASOPHILS NFR BLD AUTO: 1.2 %
BENZODIAZ UR QL SCN: NEGATIVE
BSA FOR ECHO PROCEDURE: 2.31 M2
BUN SERPL-MCNC: 14 MG/DL (ref 6–20)
CALCIUM SERPL-MCNC: 9 MG/DL (ref 8.7–10.5)
CANNABINOIDS UR QL SCN: NEGATIVE
CHLORIDE SERPL-SCNC: 106 MMOL/L (ref 95–110)
CHOLEST SERPL-MCNC: 131 MG/DL (ref 120–199)
CHOLEST/HDLC SERPL: 2.7 {RATIO} (ref 2–5)
CO2 SERPL-SCNC: 23 MMOL/L (ref 23–29)
COCAINE UR QL SCN: NEGATIVE
CREAT SERPL-MCNC: 0.9 MG/DL (ref 0.5–1.4)
CREAT UR-MCNC: 136.9 MG/DL (ref 23–375)
CV ECHO LV RWT: 0.59 CM
DOP CALC AO PEAK VEL: 1.2 M/S
DOP CALC AO VTI: 20.8 CM
DOP CALC LVOT AREA: 4.5 CM2
DOP CALC LVOT DIAMETER: 2.4 CM
DOP CALC LVOT PEAK VEL: 0.9 M/S
DOP CALC MV VTI: 18.1 CM
DOP CALCLVOT PEAK VEL VTI: 13.6 CM
E/E' RATIO: 5 M/S
EAG (SMH): 88 MG/DL (ref 68–131)
ECHO LV POSTERIOR WALL: 1.3 CM (ref 0.6–1.1)
ERYTHROCYTE [DISTWIDTH] IN BLOOD BY AUTOMATED COUNT: 12.8 % (ref 11.5–14.5)
FRACTIONAL SHORTENING: 36.4 % (ref 28–44)
GFR SERPLBLD CREATININE-BSD FMLA CKD-EPI: >60 ML/MIN/1.73/M2
GLUCOSE SERPL-MCNC: 103 MG/DL (ref 70–110)
HBA1C MFR BLD: 4.7 % (ref 4.5–6.2)
HCT VFR BLD AUTO: 45 % (ref 40–54)
HCV AB SERPL QL IA: NORMAL
HDLC SERPL-MCNC: 49 MG/DL (ref 40–75)
HDLC SERPL: 37.4 % (ref 20–50)
HGB BLD-MCNC: 15.6 GM/DL (ref 14–18)
HIV 1+2 AB+HIV1 P24 AG SERPL QL IA: NORMAL
IMM GRANULOCYTES # BLD AUTO: 0.04 K/UL (ref 0–0.04)
IMM GRANULOCYTES NFR BLD AUTO: 0.4 % (ref 0–0.5)
INTERVENTRICULAR SEPTUM: 1.3 CM (ref 0.6–1.1)
IVC DIAMETER: 1.35 CM
LDLC SERPL CALC-MCNC: 67.6 MG/DL (ref 63–159)
LEFT ATRIUM AREA SYSTOLIC (APICAL 2 CHAMBER): 21.2 CM2
LEFT ATRIUM AREA SYSTOLIC (APICAL 4 CHAMBER): 17.5 CM2
LEFT ATRIUM VOLUME INDEX MOD: 22 ML/M2
LEFT ATRIUM VOLUME MOD: 50 ML
LEFT INTERNAL DIMENSION IN SYSTOLE: 2.8 CM (ref 2.1–4)
LEFT VENTRICLE DIASTOLIC VOLUME INDEX: 36.8 ML/M2
LEFT VENTRICLE DIASTOLIC VOLUME: 85 ML
LEFT VENTRICLE END DIASTOLIC VOLUME APICAL 2 CHAMBER: 123 ML
LEFT VENTRICLE END DIASTOLIC VOLUME APICAL 4 CHAMBER: 135 ML
LEFT VENTRICLE END SYSTOLIC VOLUME APICAL 2 CHAMBER: 60.6 ML
LEFT VENTRICLE END SYSTOLIC VOLUME APICAL 4 CHAMBER: 37.4 ML
LEFT VENTRICLE MASS INDEX: 93.1 G/M2
LEFT VENTRICLE SYSTOLIC VOLUME INDEX: 13 ML/M2
LEFT VENTRICLE SYSTOLIC VOLUME: 30 ML
LEFT VENTRICULAR INTERNAL DIMENSION IN DIASTOLE: 4.4 CM (ref 3.5–6)
LEFT VENTRICULAR MASS: 215.1 G
LV LATERAL E/E' RATIO: 4.6 M/S
LV SEPTAL E/E' RATIO: 6.3 M/S
LVED V (TEICH): 85.36 ML
LVES V (TEICH): 29.55 ML
LVOT MG: 2 MMHG
LVOT MV: 0.61 CM/S
LYMPHOCYTES # BLD AUTO: 4.1 K/UL (ref 1–4.8)
Lab: 1.4 CM/M
MAGNESIUM SERPL-MCNC: 2.3 MG/DL (ref 1.6–2.6)
MCH RBC QN AUTO: 29.4 PG (ref 27–31)
MCHC RBC AUTO-ENTMCNC: 34.7 G/DL (ref 32–36)
MCV RBC AUTO: 85 FL (ref 82–98)
MV MEAN GRADIENT: 1 MMHG
MV PEAK E VEL: 0.82 M/S
MV PEAK GRADIENT: 3 MMHG
MV VALVE AREA BY CONTINUITY EQUATION: 3.4 CM2
NONHDLC SERPL-MCNC: 82 MG/DL
NUCLEATED RBC (/100WBC) (SMH): 0 /100 WBC
OHS CV CPX PATIENT HEIGHT IN: 76
OHS LV EJECTION FRACTION SIMPSONS BIPLANE MOD: 57 %
OHS QRS DURATION: 82 MS
OHS QRS DURATION: 88 MS
OHS QRS DURATION: 92 MS
OHS QTC CALCULATION: 409 MS
OHS QTC CALCULATION: 411 MS
OHS QTC CALCULATION: 425 MS
OPIATES UR QL SCN: NEGATIVE
PCP UR QL: NEGATIVE
PHOSPHATE SERPL-MCNC: 4.5 MG/DL (ref 2.7–4.5)
PLATELET # BLD AUTO: 307 K/UL (ref 150–450)
PMV BLD AUTO: 10 FL (ref 9.2–12.9)
POTASSIUM SERPL-SCNC: 3.8 MMOL/L (ref 3.5–5.1)
PV MV: 0.68 M/S
PV PEAK GRADIENT: 4 MMHG
PV PEAK VELOCITY: 0.94 M/S
RA PRESSURE ESTIMATED: 8 MMHG
RBC # BLD AUTO: 5.31 M/UL (ref 4.6–6.2)
RELATIVE EOSINOPHIL (SMH): 2.3 % (ref 0–8)
RELATIVE LYMPHOCYTE (SMH): 42.5 % (ref 18–48)
RELATIVE MONOCYTE (SMH): 8.1 % (ref 4–15)
RELATIVE NEUTROPHIL (SMH): 45.5 % (ref 38–73)
RIGHT ATRIUM END SYSTOLIC VOLUME APICAL 4 CHAMBER INDEX BSA: 21.6 ML/M2
RIGHT ATRIUM VOLUME AREA LENGTH APICAL 4 CHAMBER: 49.9 ML
RV TISSUE DOPPLER FREE WALL SYSTOLIC VELOCITY 1 (APICAL 4 CHAMBER VIEW): 10.1 CM/S
SODIUM SERPL-SCNC: 143 MMOL/L (ref 136–145)
TDI LATERAL: 0.18 M/S
TDI SEPTAL: 0.13 M/S
TDI: 0.16 M/S
TRICUSPID ANNULAR PLANE SYSTOLIC EXCURSION: 2.1 CM
TRIGL SERPL-MCNC: 72 MG/DL (ref 30–150)
TROPONIN HIGH SENSITIVE (SMH): 2.4 PG/ML
TROPONIN HIGH SENSITIVE (SMH): 5.1 PG/ML
TROPONIN HIGH SENSITIVE (SMH): 5.8 PG/ML
WBC # BLD AUTO: 9.64 K/UL (ref 3.9–12.7)
Z-SCORE OF LEFT VENTRICULAR DIMENSION IN END DIASTOLE: -7.25
Z-SCORE OF LEFT VENTRICULAR DIMENSION IN END SYSTOLE: -5.3

## 2025-07-29 PROCEDURE — 96366 THER/PROPH/DIAG IV INF ADDON: CPT

## 2025-07-29 PROCEDURE — 80307 DRUG TEST PRSMV CHEM ANLYZR: CPT | Performed by: FAMILY MEDICINE

## 2025-07-29 PROCEDURE — G0378 HOSPITAL OBSERVATION PER HR: HCPCS

## 2025-07-29 PROCEDURE — 93005 ELECTROCARDIOGRAM TRACING: CPT | Performed by: GENERAL PRACTICE

## 2025-07-29 PROCEDURE — 93306 TTE W/DOPPLER COMPLETE: CPT

## 2025-07-29 PROCEDURE — 25000003 PHARM REV CODE 250: Performed by: NURSE PRACTITIONER

## 2025-07-29 PROCEDURE — 84484 ASSAY OF TROPONIN QUANT: CPT | Mod: 91 | Performed by: INTERNAL MEDICINE

## 2025-07-29 PROCEDURE — 96365 THER/PROPH/DIAG IV INF INIT: CPT | Mod: 59

## 2025-07-29 PROCEDURE — 83735 ASSAY OF MAGNESIUM: CPT

## 2025-07-29 PROCEDURE — 63600175 PHARM REV CODE 636 W HCPCS

## 2025-07-29 PROCEDURE — 99223 1ST HOSP IP/OBS HIGH 75: CPT | Mod: 25,,, | Performed by: GENERAL PRACTICE

## 2025-07-29 PROCEDURE — 84484 ASSAY OF TROPONIN QUANT: CPT

## 2025-07-29 PROCEDURE — 93010 ELECTROCARDIOGRAM REPORT: CPT | Mod: ,,, | Performed by: GENERAL PRACTICE

## 2025-07-29 PROCEDURE — 96372 THER/PROPH/DIAG INJ SC/IM: CPT

## 2025-07-29 PROCEDURE — 84484 ASSAY OF TROPONIN QUANT: CPT | Mod: 91

## 2025-07-29 PROCEDURE — 36415 COLL VENOUS BLD VENIPUNCTURE: CPT

## 2025-07-29 PROCEDURE — 84100 ASSAY OF PHOSPHORUS: CPT

## 2025-07-29 PROCEDURE — 83036 HEMOGLOBIN GLYCOSYLATED A1C: CPT

## 2025-07-29 PROCEDURE — 93306 TTE W/DOPPLER COMPLETE: CPT | Mod: 26,,, | Performed by: GENERAL PRACTICE

## 2025-07-29 PROCEDURE — 25000003 PHARM REV CODE 250

## 2025-07-29 PROCEDURE — 83718 ASSAY OF LIPOPROTEIN: CPT

## 2025-07-29 PROCEDURE — 82310 ASSAY OF CALCIUM: CPT

## 2025-07-29 PROCEDURE — 93010 ELECTROCARDIOGRAM REPORT: CPT | Mod: 76,,, | Performed by: GENERAL PRACTICE

## 2025-07-29 PROCEDURE — 36415 COLL VENOUS BLD VENIPUNCTURE: CPT | Performed by: INTERNAL MEDICINE

## 2025-07-29 PROCEDURE — 85025 COMPLETE CBC W/AUTO DIFF WBC: CPT

## 2025-07-29 RX ORDER — METOPROLOL TARTRATE 25 MG/1
12.5 TABLET ORAL 2 TIMES DAILY
Status: DISCONTINUED | OUTPATIENT
Start: 2025-07-29 | End: 2025-07-30 | Stop reason: HOSPADM

## 2025-07-29 RX ORDER — FLECAINIDE ACETATE 100 MG/1
100 TABLET ORAL EVERY 12 HOURS
Status: DISCONTINUED | OUTPATIENT
Start: 2025-07-29 | End: 2025-07-30 | Stop reason: HOSPADM

## 2025-07-29 RX ORDER — SERTRALINE HYDROCHLORIDE 25 MG/1
25 TABLET, FILM COATED ORAL DAILY
Status: DISCONTINUED | OUTPATIENT
Start: 2025-07-29 | End: 2025-07-30 | Stop reason: HOSPADM

## 2025-07-29 RX ORDER — FLECAINIDE ACETATE 100 MG/1
100 TABLET ORAL EVERY 12 HOURS
Status: DISCONTINUED | OUTPATIENT
Start: 2025-07-29 | End: 2025-07-29

## 2025-07-29 RX ADMIN — POTASSIUM BICARBONATE 50 MEQ: 977.5 TABLET, EFFERVESCENT ORAL at 08:07

## 2025-07-29 RX ADMIN — ENOXAPARIN SODIUM 100 MG: 100 INJECTION SUBCUTANEOUS at 12:07

## 2025-07-29 RX ADMIN — ENOXAPARIN SODIUM 100 MG: 100 INJECTION SUBCUTANEOUS at 11:07

## 2025-07-29 RX ADMIN — METOPROLOL TARTRATE 12.5 MG: 25 TABLET, FILM COATED ORAL at 09:07

## 2025-07-29 RX ADMIN — FLECAINIDE ACETATE 100 MG: 100 TABLET ORAL at 04:07

## 2025-07-29 RX ADMIN — SERTRALINE HYDROCHLORIDE 25 MG: 25 TABLET ORAL at 08:07

## 2025-07-29 NOTE — SUBJECTIVE & OBJECTIVE
No past medical history on file.    No past surgical history on file.    Review of patient's allergies indicates:  No Known Allergies    No current facility-administered medications on file prior to encounter.     Current Outpatient Medications on File Prior to Encounter   Medication Sig    amLODIPine (NORVASC) 5 MG tablet Take 1 tablet (5 mg total) by mouth once daily.    sertraline (ZOLOFT) 25 MG tablet Take 1 tablet (25 mg total) by mouth once daily.    [DISCONTINUED] cetirizine (ZYRTEC) 10 MG tablet Take 1 tablet (10 mg total) by mouth once daily.    [DISCONTINUED] fluticasone (FLONASE) 50 mcg/actuation nasal spray 1 spray (50 mcg total) by Each Nare route once daily.     Family History    None       Tobacco Use    Smoking status: Former     Types: Cigarettes    Smokeless tobacco: Not on file   Substance and Sexual Activity    Alcohol use: Not on file    Drug use: Not on file    Sexual activity: Not on file     Review of Systems   Constitutional:  Negative for chills, diaphoresis and fever.   Respiratory:  Negative for shortness of breath.    Cardiovascular:  Positive for palpitations. Negative for chest pain.   Gastrointestinal:  Negative for abdominal pain, diarrhea, nausea and vomiting.   Genitourinary:  Negative for dysuria and hematuria.   Neurological:  Negative for dizziness, syncope and light-headedness.     Objective:     Vital Signs (Most Recent):  Temp: 97.7 °F (36.5 °C) (07/28/25 2003)  Pulse: 100 (07/28/25 2315)  Resp: 18 (07/28/25 2315)  BP: (!) 164/87 (07/28/25 2315)  SpO2: 97 % (07/28/25 2315) Vital Signs (24h Range):  Temp:  [97.7 °F (36.5 °C)] 97.7 °F (36.5 °C)  Pulse:  [] 100  Resp:  [14-18] 18  SpO2:  [97 %] 97 %  BP: (141-180)/() 164/87     Weight: 99.8 kg (220 lb)  Body mass index is 26.78 kg/m².     Physical Exam  Vitals reviewed.   Constitutional:       General: He is awake. He is not in acute distress.     Appearance: Normal appearance. He is not ill-appearing or diaphoretic.    Cardiovascular:      Rate and Rhythm: Tachycardia present. Rhythm irregularly irregular.      Heart sounds: Normal heart sounds. No murmur heard.     No friction rub. No gallop.      Comments: Atrial fibrillation noted on monitor during interview and exam.  Pulmonary:      Effort: Pulmonary effort is normal. No accessory muscle usage or respiratory distress.      Breath sounds: Normal breath sounds. No wheezing, rhonchi or rales.   Abdominal:      General: Abdomen is flat. Bowel sounds are normal.      Palpations: Abdomen is soft.      Tenderness: There is no abdominal tenderness. There is no guarding or rebound.   Musculoskeletal:      Right lower leg: No edema.      Left lower leg: No edema.   Skin:     General: Skin is warm and dry.   Neurological:      Mental Status: He is alert and oriented to person, place, and time.   Psychiatric:         Behavior: Behavior is cooperative.                Significant Labs: All pertinent labs within the past 24 hours have been reviewed.  CBC:   Recent Labs   Lab 07/28/25 2022   WBC 8.83   HGB 16.5   HCT 47.6        CMP:   Recent Labs   Lab 07/28/25 2022      K 4.0      CO2 26   *   BUN 15   CREATININE 1.0   CALCIUM 9.4   PROT 8.3   ALBUMIN 5.3*   BILITOT 0.7   ALKPHOS 69   AST 20   ALT 18   ANIONGAP 8     Cardiac Markers:   Recent Labs   Lab 07/28/25 2022   BNP 8     Coagulation:   Recent Labs   Lab 07/28/25 2022   INR 1.0     Magnesium:   Recent Labs   Lab 07/28/25 2022   MG 2.1     TSH:   Recent Labs   Lab 07/28/25 2024   TSH 1.234       Significant Imaging:   Imaging Results              X-Ray Chest AP Portable (Final result)  Result time 07/28/25 21:42:46      Final result by Augie Sommers MD (07/28/25 21:42:46)                   Impression:      No evidence of acute cardiopulmonary process.      Electronically signed by: Augie Sommers  Date:    07/28/2025  Time:    21:42               Narrative:    EXAMINATION:  XR CHEST AP  PORTABLE    CLINICAL HISTORY:  afib;    TECHNIQUE:  Single frontal view of the chest was performed.    COMPARISON:  Radiographs of the chest from 02/18/2009    FINDINGS:  Heart size and pulmonary vasculature are within normal limits.  No evidence of focal consolidation, pneumothorax, or pleural effusion.  No evidence of acute osseous process.

## 2025-07-29 NOTE — NURSING
Noted rhythm change from AF to SR. Change noted around 1729. EKG obtained. Both hospital and cardiology MDs notified.

## 2025-07-29 NOTE — PLAN OF CARE
Significant other at bedside during SIBR.  Patient reports cardiology has already evaluated and recommended medical management. Currently on Cardizem gtt. During SIBR, patient noted to be in atrial fibrillation with ventricular rates below 100.  Patient reports vaping and occasional social alcohol use, approximately three times per month. No other known contributing factors identified at this time.     07/29/25 1216   Rounds   Attendance Provider;Nurse ;Assigned nurse   Discharge Plan A Home   Why the patient remains in the hospital Requires continued medical care   Transition of Care Barriers None

## 2025-07-29 NOTE — ED PROVIDER NOTES
Encounter Date: 7/28/2025       History     Chief Complaint   Patient presents with    Palpitations     Hx AFIB, but not on meds. Palpitations began 10 mins ago      HPI    26 Yo M PMH/o afib s/p chemical cardioversion and outpatient cards workup (although results unknown, tests performed by the  and not released to patient), HTN (on amlodipine 5, but patient can't recall last time taking it), anxiety (on zoloft) presents to the ED with palpitations that began 10 minutres PTA. Denies cp, SOB, n/v/d, lightheadedness, dizziness. Denies caffeine, alcohol, new medications, drugs, flu-like symptoms. Feels otherwise well.     Review of patient's allergies indicates:  No Known Allergies  No past medical history on file.  No past surgical history on file.  No family history on file.  Social History[1]  Review of Systems  See HPI      Physical Exam     Initial Vitals [07/28/25 2003]   BP Pulse Resp Temp SpO2   (!) 169/93 (!) 121 18 97.7 °F (36.5 °C) 97 %      MAP       --         Physical Exam    Nursing note and vitals reviewed.  Constitutional: He appears well-developed and well-nourished. He is not diaphoretic. No distress.   HENT:   Head: Normocephalic and atraumatic.   Eyes: Conjunctivae are normal. Right eye exhibits no discharge. Left eye exhibits no discharge. No scleral icterus.   Neck: Neck supple.   Normal range of motion.  Cardiovascular:  Normal rate, regular rhythm and normal heart sounds.     Exam reveals no gallop and no friction rub.       No murmur heard.  Pulmonary/Chest: Breath sounds normal. No stridor. No respiratory distress. He has no wheezes. He has no rhonchi. He has no rales.   Abdominal: Abdomen is soft. He exhibits no distension. There is no abdominal tenderness. There is no rebound and no guarding.   Musculoskeletal:         General: No edema.      Cervical back: Normal range of motion and neck supple.     Neurological: He is alert. GCS score is 15. GCS eye subscore is 4. GCS verbal  subscore is 5. GCS motor subscore is 6.   Skin: Skin is warm and dry. Capillary refill takes less than 2 seconds. No rash noted. No erythema.         ED Course   Procedures  Labs Reviewed   PROTIME-INR - Normal       Result Value    PT 10.9      INR 1.0     CBC WITH DIFFERENTIAL - Normal    WBC 8.83      RBC 5.61      Hgb 16.5      Hct 47.6      MCV 85      MCH 29.4      MCHC 34.7      RDW 12.6      Platelet Count 302      MPV 9.6      Nucleated RBC 0      Neut % 45.5      Lymph % 41.0      Mono % 8.8      Eos % 3.4      Basophil % 1.0      Imm Grans % 0.3      Neut # 4.0      Lymph # 3.62      Mono # 0.78      Eos # 0.30      Baso # 0.09      Imm Grans # 0.03     CBC W/ AUTO DIFFERENTIAL    Narrative:     The following orders were created for panel order CBC auto differential.  Procedure                               Abnormality         Status                     ---------                               -----------         ------                     CBC with Differential[[id number]]       Normal              Final result                 Please view results for these tests on the individual orders.   HEPATITIS C ANTIBODY   HEP C VIRUS HOLD SPECIMEN   HIV 1 / 2 ANTIBODY   HIV VIRUS CONFIRMATION HOLD SPECIMEN   COMPREHENSIVE METABOLIC PANEL   B-TYPE NATRIURETIC PEPTIDE    Narrative:     The following orders were created for panel order Brain natriuretic peptide.  Procedure                               Abnormality         Status                     ---------                               -----------         ------                     BNP Performable[[id number]]                                 In process                   Please view results for these tests on the individual orders.   MAGNESIUM   TROPONIN I HIGH SENSITIVITY   B-TYPE NATRIURETIC PEPTIDE (PERFORMABLE ONLY)   TSH   D DIMER, QUANTITATIVE          Imaging Results              X-Ray Chest AP Portable (In process)                      Medications   lactated  ringers bolus 500 mL (500 mLs Intravenous New Bag 7/28/25 2022)   magnesium sulfate 2g in water 50mL IVPB (premix) (2 g Intravenous New Bag 7/28/25 2021)   diltiaZEM injection 10 mg (10 mg Intravenous Given 7/28/25 2023)     Medical Decision Making  Problems Addressed:  Atrial fib/flutter, transient: acute illness or injury that poses a threat to life or bodily functions  Atrial fibrillation with RVR: acute illness or injury that poses a threat to life or bodily functions  Chest pain: acute illness or injury that poses a threat to life or bodily functions    Amount and/or Complexity of Data Reviewed  Labs: ordered. Decision-making details documented in ED Course.  Radiology: ordered. Decision-making details documented in ED Course.    Risk  Prescription drug management.  Decision regarding hospitalization.    Critical Care  Total time providing critical care: 30 minutes    Tachycardic to the 160s. A fib with RVR with TWI in lead III, and non-specific Twave abnormality in the far lateral leads. No evidence of accessory pathway. Normotensive. Stable. Afebrile. DDX includes afib RVR, accessory pathway, hyperthyroidism, NSTEMI. Labwork and imaging ordered accordingly. Fluids, mag, and dilt given. See ED course for further MDM.            ED Course as of 07/29/25 0013   Mon Jul 28, 2025 2005 BP(!): 169/93 [MK]   2028 Troponin I High Sensitivity: 2.6 [MK]   2040 After medication, a fib persisted but rate improved [MK]   2059 X-Ray Chest AP Portable  No evidence of effusion, ptx, mediastinum and cardiac silhouette WNL, no evidence of pna [MK]   2059 CBC auto differential  No leukocytosis, no anemia, no thrombocytopenia [MK]   2108 D-Dimer: 0.30  Low concern for PE [MK]   2205 Comprehensive metabolic panel(!)  Electrolyte WNL, no LORELEI, LFTs WNL, normal AG [MK]   2215 Case discussed with cardiology. Will admit for cards evaluation and cardioversion in the AM.  [MK]      ED Course User Index  [MK] Junaid Arrieta MD                                Clinical Impression:  Final diagnoses:  [R07.9] Chest pain                       [1]   Social History  Tobacco Use    Smoking status: Former     Types: Cigarettes        Junaid Arrieta MD  Resident  07/29/25 0015

## 2025-07-29 NOTE — HOSPITAL COURSE
25 year old male patient with a PMH significant for HTN and anxiety admitted to ER with Palpitations. He was found to be in AFIB.  Only known risk factor as he says that he vapes.  Seen and evaluated by Cardiology and patient was converted to sinus rhythm with flecainide overnight.  Continue p.o. flecainide, metoprolol, apixaban on discharge and follow up with Cardiology.  Echocardiogram was performed and EF 55-60%, normal wall motion.  He does elicit a history of vaping and he has been advised to stop.

## 2025-07-29 NOTE — PROGRESS NOTES
[facility] Medicine  Progress Note    Patient Name: Mc Vasques  MRN: [id number]  Patient Class: OP- Observation   Admission Date: 7/28/2025  Length of Stay: 0 days  Attending Physician: Noemí Bolden DO  Primary Care Provider: Felicia Aviles NP        Subjective     Principal Problem:Atrial fibrillation with RVR        HPI:  Mr. Vasques is a 25 yr old male with a hx of HTN and anxiety that presented to the ED with a CC of palpitations that began about 10 minutes PTA to ED. patient endorses that he was in the bathtub when he began having palpitations.  He states that he tried drinking 3 bottles of water and relaxing prior to coming to the ED for evaluation.  He states that they lasted about 45 minutes before resolving.  He denies ever having a history of AFib.  He denies excessive caffeine use and states that he occasionally will drink tea but mostly drinks Powerade and water.  He states he vapes daily and occasionally will drink alcohol maybe 1 or 2 weekends a month.  He denies recreational drug use.  He denies fever, chills, dyspnea, diaphoresis, chest pain, abdominal pain, nausea, vomiting, diarrhea, dysuria, hematuria, lightheadedness, dizziness, and syncope.    Upon arrival to ED, patient afebrile, HR of 121, RR of 18, BP of 169/93, Satting 97% on RA. Workup in the ED revealed glucose of 117, albumin of 5.3.  Remainder of labs unremarkable.  CXR shows no evidence of acute cardiopulmonary process.  Patient was given diltiazem 10 mg IV x2,  mL bolus, Mag sulfate 2 g IV while in the ED. ED provider discussed case with Cardiology who recommended admission and will see patient tomorrow.  Patient will be placed under observation for further management.       Overview/Hospital Course:  25 year old male patient with a PMH significant for HTN and anxiety admitted to ER with Palpitations. He was found to be in AFIB.  Only known risk factor as he says that he vapes.  Seen and  evaluated by Cardiology and they are attempting flecainide conversion, if that fails consider RAUL and electrocardiogram in the a.m..    Interval History:  Patient seen and examined, no acute complaints    Review of Systems   Constitutional:  Negative for chills, diaphoresis and fever.   Respiratory:  Negative for shortness of breath.    Cardiovascular:  Negative for chest pain and palpitations.   Gastrointestinal:  Negative for abdominal pain, diarrhea, nausea and vomiting.   Genitourinary:  Negative for dysuria and hematuria.   Neurological:  Negative for dizziness, syncope and light-headedness.     Objective:     Vital Signs (Most Recent):  Temp: 97.7 °F (36.5 °C) (07/29/25 1105)  Pulse: 89 (07/29/25 1000)  Resp: 16 (07/29/25 1000)  BP: (!) 144/79 (07/29/25 1000)  SpO2: 97 % (07/29/25 1000) Vital Signs (24h Range):  Temp:  [97.7 °F (36.5 °C)-98.5 °F (36.9 °C)] 97.7 °F (36.5 °C)  Pulse:  [] 89  Resp:  [14-27] 16  SpO2:  [95 %-98 %] 97 %  BP: (119-180)/() 144/79     Weight: 99.8 kg (220 lb 0.3 oz)  Body mass index is 26.78 kg/m².    Intake/Output Summary (Last 24 hours) at 7/29/2025 1507  Last data filed at 7/29/2025 1428  Gross per 24 hour   Intake 790 ml   Output 1700 ml   Net -910 ml         Physical Exam  Vitals reviewed.   Constitutional:       General: He is awake. He is not in acute distress.     Appearance: Normal appearance. He is not ill-appearing or diaphoretic.   Cardiovascular:      Rate and Rhythm: Normal rate. Rhythm irregular.      Heart sounds: Normal heart sounds. No murmur heard.     No friction rub. No gallop.   Pulmonary:      Effort: Pulmonary effort is normal. No accessory muscle usage or respiratory distress.      Breath sounds: Normal breath sounds. No wheezing, rhonchi or rales.   Abdominal:      General: Abdomen is flat. Bowel sounds are normal.      Palpations: Abdomen is soft.      Tenderness: There is no abdominal tenderness. There is no guarding or rebound.    Musculoskeletal:      Right lower leg: No edema.      Left lower leg: No edema.   Skin:     General: Skin is warm and dry.   Neurological:      Mental Status: He is alert and oriented to person, place, and time.   Psychiatric:         Behavior: Behavior is cooperative.               Significant Labs: All pertinent labs within the past 24 hours have been reviewed.  Recent Lab Results  (Last 5 results in the past 24 hours)        07/29/25  0945   07/29/25  0926   07/29/25  0356   07/29/25  0015   07/28/25 2024        Phencyclidine   Negative             Amphetamines, Urine   Negative             Anion Gap     14           Barbituates, Urine   Negative             Baso #     0.12           Basophil %     1.2           Benzodiazepine, Urine   Negative             BUN     14           Calcium     9.0           Chloride     106           Cholesterol Total     131  Comment: The National Cholesterol Education Program (NCEP) has set the  following guidelines (reference ranges) for Cholesterol:  Optimal.....................<200 mg/dL  Borderline High.............200-239 mg/dL  High........................> or = 240 mg/dL           CO2     23           Cocaine, Urine   Negative             Creatinine     0.9           Urine Creatinine   136.9  Comment: The random urine reference ranges provided were established   for 24 hour urine collections.  No reference ranges exist for  random urine specimens.  Correlate clinically.             eGFR     >60           Eos #     0.22           Eos %     2.3           Estimated Avg Glucose     88           Glucose     103           HDL     49  Comment: The National Cholesterol Education Program (NCEP) has set the  following guidelines (reference values) for HDL Cholesterol:  Low...............<40 mg/dL  Optimal...........>60 mg/dL           HDL/Cholesterol Ratio     37.4           Hematocrit     45.0           Hemoglobin     15.6           Hemoglobin A1C External     4.7  Comment:  According to ADA guidelines, hemoglobin A1C <7.0% represents  optimal control in non-pregnant diabetic patients.  Different  metrics may apply to specific populations.      Standards of Medical Care in Diabetes - 2016.    For the purpose of screening for the presence of diabetes:  <5.7%     Consistent with the absence of diabetes  5.7-6.4%  Consistent with increasing risk for diabetes             (prediabetes)  >or=6.5%  Consistent with diabetes    Currently no consensus exists for use of hemoglobin A1C  for diagnosis of diabetes for children.           Immature Grans (Abs)     0.04  Comment: Mild elevation in immature granulocytes is non specific and can be seen in a variety of conditions including stress response, acute inflammation, trauma and pregnancy. Correlation with other laboratory and clinical findings is essential.           Immature Granulocytes     0.4           LDL Cholesterol     67.6  Comment: The National Cholesterol Education Program (NCEP) has set the  following guidelines (reference values) for LDL Cholesterol:  Optimal.......................<130 mg/dL  Borderline High...............130-159 mg/dL  High..........................160-189 mg/dL  Very High.....................>190 mg/dL             Lymph #     4.10           LYMPH %     42.5           Magnesium      2.3           MCH     29.4           MCHC     34.7           MCV     85           Mono #     0.78           Mono %     8.1           MPV     10.0           Neut #     4.4           Neut %     45.5           Non-HDL Cholesterol     82  Comment: Risk category and Non-HDL cholesterol goals:  Coronary heart disease (CHD)or equivalent (10-year risk of CHD >20%):  Non-HDL cholesterol goal     <130 mg/dL  Two or more CHD risk factors and 10-year risk of CHD <= 20%:  Non-HDL cholesterol goal     <160 mg/dL  0 to 1 CHD risk factor:  Non-HDL cholesterol goal     <190 mg/dL           nRBC     0           Opiates, Urine   Negative              Phosphorus Level     4.5           Platelet Count     307           Potassium     3.8           RBC     5.31           RDW     12.8           Sodium     143           Marijuana (THC) Metabolite   Negative             Total Cholesterol/HDL Ratio     2.7           Triglycerides     72  Comment: The National Cholesterol Education Program (NCEP) has set the  following guidelines (reference values) for triglycerides:  Normal......................<150 mg/dL  Borderline High.............150-199 mg/dL  High........................200-499 mg/dL             Troponin I High Sensitivity 2.4  Comment: Troponin results differ between methods. Do not use   results between Troponin methods interchangeably.    Alkaline Phospatase levels above 400 U/L may   cause false positive results.    Access hsTnI should not be used for patients taking   Asfotase keena (Strensiq).     5.1  Comment: Troponin results differ between methods. Do not use   results between Troponin methods interchangeably.    Alkaline Phospatase levels above 400 U/L may   cause false positive results.    Access hsTnI should not be used for patients taking   Asfotase keena (Strensiq).   5.8  Comment: Troponin results differ between methods. Do not use   results between Troponin methods interchangeably.    Alkaline Phospatase levels above 400 U/L may   cause false positive results.    Access hsTnI should not be used for patients taking   Asfotase keena (Strensiq).         TSH         1.234       WBC     9.64                                  Significant Imaging: I have reviewed all pertinent imaging results/findings within the past 24 hours.      Assessment & Plan  Atrial fibrillation with RVR  Patient has new onset atrial fibrillation. Patient is currently in atrial fibrillation. [id number] Score: The patient doesn't have any registry metric data available. The patients heart rate in the last 24 hours is as follows:  Pulse  Min: 80  Max: 179     Antiarrhythmics  flecainide  tablet 100 mg, Every 12 hours, Oral  metoprolol tartrate (LOPRESSOR) split tablet 12.5 mg, 2 times daily, Oral    Anticoagulants  enoxaparin injection 100 mg, Every 12 hours, Subcutaneous    Plan  - Replete lytes with a goal of K>4, Mg >2  - Patient is anticoagulated, see medications listed above.  - Patient's afib is currently uncontrolled. Will adjust treatment as follows:  Patient received diltiazem 10 mg IV x2 in the ED with improvement of heart rate.  However, patient's heart rate is slowly trending upwards.  Will add diltiazem titrating gtt at this time.  - Full Dose lovenox ordered  - Cardiology consulted, appreciate recs - start flecainide and if unable to convert plan for cardioversion tomorrow  - NPO at midnight  - Trend trops  - TTE pending  - TSH WNL  - Lipid and A1c pending  - Continuous tele monitoring  Primary hypertension  Chronic, uncontrolled.  Latest blood pressure and vitals reviewed-     Temp:  [97.7 °F (36.5 °C)-98.5 °F (36.9 °C)]   Pulse:  []   Resp:  [14-27]   BP: (119-180)/()   SpO2:  [95 %-98 %] .   Home meds for hypertension were reviewed and noted below-  Hypertension Medications              amLODIPine (NORVASC) 5 MG tablet Take 1 tablet (5 mg total) by mouth once daily.            While in the hospital, will manage blood pressure as follows; Adjust home antihypertensive regimen as follows- holding home amlodipine while patient is on diltiazem gtt, resume once appropriate    Will utilize p.r.n. blood pressure medication only if patient's blood pressure greater than 180/110 and he develops symptoms such as worsening chest pain or shortness of breath.    - Patient does not seem to be compliant with home medications per med rec completed by pharmacy.    VTE Risk Mitigation (From admission, onward)           Ordered     enoxaparin injection 100 mg  Every 12 hours         07/28/25 2320     IP VTE LOW RISK PATIENT  Once         07/28/25 2320     Place sequential compression device   Until discontinued         07/28/25 2750                    Discharge Planning   CECILIO: 7/30/2025     Code Status: Full Code   Medical Readiness for Discharge Date:   Discharge Plan A: Home                        Noemí Bolden DO  Department of Hospital Medicine   [facility]

## 2025-07-29 NOTE — CONSULTS
Counts include 234 beds at the [facility]  Department of Cardiology  Consult Note      PATIENT NAME: Mc Vasques  MRN: [id number]  TODAY'S DATE: 07/29/2025  ADMIT DATE: 7/28/2025                          CONSULT REQUESTED BY: Noemí Bolden DO    SUBJECTIVE     PRINCIPAL PROBLEM: Atrial fibrillation with RVR      REASON FOR CONSULT:  AFIB      HPI:    25 year old male patient with a PMH significant for HTN and anxiety admitted to ER with Palpitations. He was found to be in AFIB. He e denies excessive caffeine use and states that he occasionally will drink tea but mostly drinks Powerade and water.  He states he vapes daily and occasionally will drink alcohol maybe 1 or 2 weekends a month.  He denies recreational drug use.  He denies fever, chills, dyspnea, diaphoresis, chest pain, abdominal pain, nausea, vomiting, diarrhea, and/or  dizziness. Currently in AFIB.      FROM H AND P     HPI: Mr. Vasques is a 25 yr old male with a hx of HTN and anxiety that presented to the ED with a CC of palpitations that began about 10 minutes PTA to ED. patient endorses that he was in the bathtub when he began having palpitations.  He states that he tried drinking 3 bottles of water and relaxing prior to coming to the ED for evaluation.  He states that they lasted about 45 minutes before resolving.  He denies ever having a history of AFib.  He denies excessive caffeine use and states that he occasionally will drink tea but mostly drinks Powerade and water.  He states he vapes daily and occasionally will drink alcohol maybe 1 or 2 weekends a month.  He denies recreational drug use.  He denies fever, chills, dyspnea, diaphoresis, chest pain, abdominal pain, nausea, vomiting, diarrhea, dysuria, hematuria, lightheadedness, dizziness, and syncope.     Upon arrival to ED, patient afebrile, HR of 121, RR of 18, BP of 169/93, Satting 97% on RA. Workup in the ED revealed glucose of 117, albumin of 5.3.  Remainder of labs unremarkable.  CXR shows no  evidence of acute cardiopulmonary process.  Patient was given diltiazem 10 mg IV x2,  mL bolus, Mag sulfate 2 g IV while in the ED. ED provider discussed case with Cardiology who recommended admission and will see patient tomorrow.  Patient will be placed under observation for further management.      Review of patient's allergies indicates:  No Known Allergies    No past medical history on file.  No past surgical history on file.  Social History[1]     REVIEW OF SYSTEMS    As mentioned in HPI    OBJECTIVE     VITAL SIGNS (Most Recent)  Temp: 98.5 °F (36.9 °C) (07/29/25 0710)  Pulse: (!) 179 (07/29/25 0800)  Resp: (!) 27 (07/29/25 0800)  BP: (!) 143/83 (07/29/25 0800)  SpO2: 96 % (07/29/25 0800)    VENTILATION STATUS  Resp: (!) 27 (07/29/25 0800)  SpO2: 96 % (07/29/25 0800)           I & O (Last 24H):  Intake/Output Summary (Last 24 hours) at 7/29/2025 0940  Last data filed at 7/29/2025 0030  Gross per 24 hour   Intake 550 ml   Output 1100 ml   Net -550 ml       WEIGHTS  Wt Readings from Last 3 Encounters:   07/29/25 0038 99.8 kg (220 lb 0.3 oz)   07/29/25 0031 99.8 kg (220 lb 0.3 oz)   07/28/25 2003 99.8 kg (220 lb)   07/29/25 0931 99.8 kg (220 lb 0.3 oz)   03/31/25 1255 102.9 kg (226 lb 13.7 oz)       PHYSICAL EXAM    CONSTITUTIONAL: NAD  HEENT: Normocephalic. No pallor  NECK: no JVD  LUNGS: CTA b/l  HEART: Irregular rhythm rate controlled  ABDOMEN: soft, non-tender, bowel sounds normal  EXTREMITIES: No edema  SKIN: No rash  NEURO: AAO X 3  PSYCH: normal affect      HOME MEDICATIONS:Medications Ordered Prior to Encounter[2]    SCHEDULED MEDS:   enoxparin  1 mg/kg Subcutaneous Q12H (treatment, non-standard time)    sertraline  25 mg Oral Daily       CONTINUOUS INFUSIONS:   dilTIAZem  0-15 mg/hr Intravenous Continuous 5 mL/hr at 07/28/25 2358 5 mg/hr at 07/28/25 2358       PRN MEDS:  Current Facility-Administered Medications:     acetaminophen, 650 mg, Oral, Q4H PRN    aluminum-magnesium hydroxide-simethicone,  "30 mL, Oral, QID PRN    dextrose 50%, 12.5 g, Intravenous, PRN    dextrose 50%, 25 g, Intravenous, PRN    glucagon (human recombinant), 1 mg, Intramuscular, PRN    glucose, 16 g, Oral, PRN    glucose, 24 g, Oral, PRN    magnesium oxide, 800 mg, Oral, PRN    magnesium oxide, 800 mg, Oral, PRN    melatonin, 6 mg, Oral, Nightly PRN    naloxone, 0.02 mg, Intravenous, PRN    ondansetron, 4 mg, Intravenous, Q6H PRN    potassium bicarbonate, 35 mEq, Oral, PRN    potassium bicarbonate, 50 mEq, Oral, PRN    potassium bicarbonate, 60 mEq, Oral, PRN    potassium, sodium phosphates, 2 packet, Oral, PRN    potassium, sodium phosphates, 2 packet, Oral, PRN    potassium, sodium phosphates, 2 packet, Oral, PRN    senna-docusate, 1 tablet, Oral, BID PRN    sodium chloride 0.9%, 10 mL, Intravenous, Q12H PRN    LABS AND DIAGNOSTICS     CBC LAST 3 DAYS  Recent Labs   Lab 07/28/25 2022 07/29/25  0356   WBC 8.83 9.64   RBC 5.61 5.31   HGB 16.5 15.6   HCT 47.6 45.0   MCV 85 85   MCH 29.4 29.4   MCHC 34.7 34.7   RDW 12.6 12.8    307   MPV 9.6 10.0   NRBC 0 0       COAGULATION LAST 3 DAYS  Recent Labs   Lab 07/28/25 2022   INR 1.0       CHEMISTRY LAST 3 DAYS  Recent Labs   Lab 07/28/25 2022 07/29/25  0356    143   K 4.0 3.8    106   CO2 26 23   ANIONGAP 8 14   BUN 15 14   CREATININE 1.0 0.9   * 103   CALCIUM 9.4 9.0   MG 2.1 2.3   ALBUMIN 5.3*  --    PROT 8.3  --    ALKPHOS 69  --    ALT 18  --    AST 20  --    BILITOT 0.7  --        CARDIAC PROFILE LAST 3 DAYS  Recent Labs   Lab 07/28/25 2022   BNP 8       ENDOCRINE LAST 3 DAYS  Recent Labs   Lab 07/28/25 2024   TSH 1.234       LAST ARTERIAL BLOOD GAS  ABG  No results for input(s): "PH", "PO2", "PCO2", "HCO3", "BE" in the last 168 hours.    LAST 7 DAYS MICROBIOLOGY   Microbiology Results (last 7 days)       ** No results found for the last 168 hours. **            MOST RECENT IMAGING  X-Ray Chest AP Portable  Narrative: EXAMINATION:  XR CHEST AP " PORTABLE    CLINICAL HISTORY:  afib;    TECHNIQUE:  Single frontal view of the chest was performed.    COMPARISON:  Radiographs of the chest from 02/18/2009    FINDINGS:  Heart size and pulmonary vasculature are within normal limits.  No evidence of focal consolidation, pneumothorax, or pleural effusion.  No evidence of acute osseous process.  Impression: No evidence of acute cardiopulmonary process.    Electronically signed by: Augie Sommers  Date:    07/28/2025  Time:    21:42      ECHOCARDIOGRAM RESULTS (last 5)  No results found for this or any previous visit.      CURRENT/PREVIOUS VISIT EKG  Results for orders placed or performed during the hospital encounter of 07/28/25   EKG 12-lead    Collection Time: 07/28/25  8:38 PM   Result Value Ref Range    QRS Duration 88 ms    OHS QTC Calculation 411 ms    Narrative    Test Reason : R07.9,    Vent. Rate :  97 BPM     Atrial Rate :    BPM     P-R Int :    ms          QRS Dur :  88 ms      QT Int : 324 ms       P-R-T Axes :     92 -25 degrees    QTcB Int : 411 ms    Atrial fibrillation  Rightward axis  Possible Anterior infarct ,age undetermined  T wave abnormality, consider inferior ischemia  Abnormal ECG  No previous ECGs available    Referred By: AAAREFERRAL SELF           Confirmed By:            ASSESSMENT/PLAN:     Active Hospital Problems    Diagnosis    *Atrial fibrillation with RVR    Primary hypertension       ASSESSMENT & PLAN:       HTN  AFIB w/ RVR  Abnormal EKG  Anxiety      RECOMMENDATIONS:      CHADS1  Awaiting ECHO  DC Cardizem  Per Dr. Mckeon, Flecanide 100 mg PO BID  EKG now and in am  Plan for RAUL/CV tomorrow if he does not convert  Continue Lovenox   Will need NOAC at least 3 months after cardioversion  Consider OP sleep test  NPO Past midnight  Consult anesthesia  Consider OP EP evaluation        LARRY Urena-ACNP-BC, CVNP-BC  Department of Cardiology  Date of Service: 07/29/2025               NEW ONSET IS ATRIAL FIBRILLATION WILL TRY  MEDICAL CARDIOVERSION FOLLOWED BY ELECTRICAL CARDIOVERSION IF HE DOES NOT CONVERT.  ANTICOAGULATION         [1]   Social History  Tobacco Use    Smoking status: Former     Types: Cigarettes   [2]   No current facility-administered medications on file prior to encounter.     Current Outpatient Medications on File Prior to Encounter   Medication Sig Dispense Refill    amLODIPine (NORVASC) 5 MG tablet Take 1 tablet (5 mg total) by mouth once daily. 90 tablet 3    sertraline (ZOLOFT) 25 MG tablet Take 1 tablet (25 mg total) by mouth once daily. 90 tablet 3

## 2025-07-29 NOTE — ASSESSMENT & PLAN NOTE
Chronic, uncontrolled.  Latest blood pressure and vitals reviewed-     Temp:  [97.7 °F (36.5 °C)]   Pulse:  []   Resp:  [14-18]   BP: (141-180)/()   SpO2:  [97 %] .   Home meds for hypertension were reviewed and noted below-  Hypertension Medications              amLODIPine (NORVASC) 5 MG tablet Take 1 tablet (5 mg total) by mouth once daily.            While in the hospital, will manage blood pressure as follows; Adjust home antihypertensive regimen as follows- holding home amlodipine while patient is on diltiazem gtt, resume once appropriate    Will utilize p.r.n. blood pressure medication only if patient's blood pressure greater than 180/110 and he develops symptoms such as worsening chest pain or shortness of breath.    - Patient does not seem to be compliant with home medications per med rec completed by pharmacy.

## 2025-07-29 NOTE — ASSESSMENT & PLAN NOTE
Patient has new onset atrial fibrillation. Patient is currently in atrial fibrillation. [id number] Score: The patient doesn't have any registry metric data available. The patients heart rate in the last 24 hours is as follows:  Pulse  Min: 91  Max: 121     Antiarrhythmics  diltiaZEM 100 mg in dextrose 5% 100 mL IVPB (ready to mix) (titrating), Continuous, Intravenous    Anticoagulants  enoxaparin injection 100 mg, Every 12 hours, Subcutaneous    Plan  - Replete lytes with a goal of K>4, Mg >2  - Patient is anticoagulated, see medications listed above.  - Patient's afib is currently uncontrolled. Will adjust treatment as follows:  Patient received diltiazem 10 mg IV x2 in the ED with improvement of heart rate.  However, patient's heart rate is slowly trending upwards.  Will add diltiazem titrating gtt at this time.  - Full Dose lovenox ordered  - Cardiology consulted, appreciate recs  - NPO at midnight  - Trend trops  - TTE pending  - TSH WNL  - Lipid and A1c pending  - Continuous tele monitoring

## 2025-07-29 NOTE — HPI
Mr. Vasques is a 25 yr old male with a hx of HTN and anxiety that presented to the ED with a CC of palpitations that began about 10 minutes PTA to ED. patient endorses that he was in the bathtub when he began having palpitations.  He states that he tried drinking 3 bottles of water and relaxing prior to coming to the ED for evaluation.  He states that they lasted about 45 minutes before resolving.  He denies ever having a history of AFib.  He denies excessive caffeine use and states that he occasionally will drink tea but mostly drinks Powerade and water.  He states he vapes daily and occasionally will drink alcohol maybe 1 or 2 weekends a month.  He denies recreational drug use.  He denies fever, chills, dyspnea, diaphoresis, chest pain, abdominal pain, nausea, vomiting, diarrhea, dysuria, hematuria, lightheadedness, dizziness, and syncope.    Upon arrival to ED, patient afebrile, HR of 121, RR of 18, BP of 169/93, Satting 97% on RA. Workup in the ED revealed glucose of 117, albumin of 5.3.  Remainder of labs unremarkable.  CXR shows no evidence of acute cardiopulmonary process.  Patient was given diltiazem 10 mg IV x2,  mL bolus, Mag sulfate 2 g IV while in the ED. ED provider discussed case with Cardiology who recommended admission and will see patient tomorrow.  Patient will be placed under observation for further management.

## 2025-07-29 NOTE — ASSESSMENT & PLAN NOTE
Patient has new onset atrial fibrillation. Patient is currently in atrial fibrillation. [id number] Score: The patient doesn't have any registry metric data available. The patients heart rate in the last 24 hours is as follows:  Pulse  Min: 80  Max: 179     Antiarrhythmics  flecainide tablet 100 mg, Every 12 hours, Oral  metoprolol tartrate (LOPRESSOR) split tablet 12.5 mg, 2 times daily, Oral    Anticoagulants  enoxaparin injection 100 mg, Every 12 hours, Subcutaneous    Plan  - Replete lytes with a goal of K>4, Mg >2  - Patient is anticoagulated, see medications listed above.  - Patient's afib is currently uncontrolled. Will adjust treatment as follows:  Patient received diltiazem 10 mg IV x2 in the ED with improvement of heart rate.  However, patient's heart rate is slowly trending upwards.  Will add diltiazem titrating gtt at this time.  - Full Dose lovenox ordered  - Cardiology consulted, appreciate recs - start flecainide and if unable to convert plan for cardioversion tomorrow  - NPO at midnight  - Trend trops  - TTE pending  - TSH WNL  - Lipid and A1c pending  - Continuous tele monitoring   No indicators present

## 2025-07-29 NOTE — H&P
[facility] - Emergency Dept  Hospital Medicine  History & Physical    Patient Name: Mc Vasques  MRN: [id number]  Patient Class: OP- Observation  Admission Date: 7/28/2025  Attending Physician: Azeem Ramsay MD   Primary Care Provider: Felicia Aviles NP         Patient information was obtained from patient, past medical records, and ER records.     Subjective:     Principal Problem:Atrial fibrillation with RVR    Chief Complaint:   Chief Complaint   Patient presents with    Palpitations     Hx AFIB, but not on meds. Palpitations began 10 mins ago         HPI: Mr. Vasques is a 25 yr old male with a hx of HTN and anxiety that presented to the ED with a CC of palpitations that began about 10 minutes PTA to ED. patient endorses that he was in the bathtub when he began having palpitations.  He states that he tried drinking 3 bottles of water and relaxing prior to coming to the ED for evaluation.  He states that they lasted about 45 minutes before resolving.  He denies ever having a history of AFib.  He denies excessive caffeine use and states that he occasionally will drink tea but mostly drinks Powerade and water.  He states he vapes daily and occasionally will drink alcohol maybe 1 or 2 weekends a month.  He denies recreational drug use.  He denies fever, chills, dyspnea, diaphoresis, chest pain, abdominal pain, nausea, vomiting, diarrhea, dysuria, hematuria, lightheadedness, dizziness, and syncope.    Upon arrival to ED, patient afebrile, HR of 121, RR of 18, BP of 169/93, Satting 97% on RA. Workup in the ED revealed glucose of 117, albumin of 5.3.  Remainder of labs unremarkable.  CXR shows no evidence of acute cardiopulmonary process.  Patient was given diltiazem 10 mg IV x2,  mL bolus, Mag sulfate 2 g IV while in the ED. ED provider discussed case with Cardiology who recommended admission and will see patient tomorrow.  Patient will be placed under observation for further  management.       No past medical history on file.    No past surgical history on file.    Review of patient's allergies indicates:  No Known Allergies    No current facility-administered medications on file prior to encounter.     Current Outpatient Medications on File Prior to Encounter   Medication Sig    amLODIPine (NORVASC) 5 MG tablet Take 1 tablet (5 mg total) by mouth once daily.    sertraline (ZOLOFT) 25 MG tablet Take 1 tablet (25 mg total) by mouth once daily.    [DISCONTINUED] cetirizine (ZYRTEC) 10 MG tablet Take 1 tablet (10 mg total) by mouth once daily.    [DISCONTINUED] fluticasone (FLONASE) 50 mcg/actuation nasal spray 1 spray (50 mcg total) by Each Nare route once daily.     Family History    None       Tobacco Use    Smoking status: Former     Types: Cigarettes    Smokeless tobacco: Not on file   Substance and Sexual Activity    Alcohol use: Not on file    Drug use: Not on file    Sexual activity: Not on file     Review of Systems   Constitutional:  Negative for chills, diaphoresis and fever.   Respiratory:  Negative for shortness of breath.    Cardiovascular:  Positive for palpitations. Negative for chest pain.   Gastrointestinal:  Negative for abdominal pain, diarrhea, nausea and vomiting.   Genitourinary:  Negative for dysuria and hematuria.   Neurological:  Negative for dizziness, syncope and light-headedness.     Objective:     Vital Signs (Most Recent):  Temp: 97.7 °F (36.5 °C) (07/28/25 2003)  Pulse: 100 (07/28/25 2315)  Resp: 18 (07/28/25 2315)  BP: (!) 164/87 (07/28/25 2315)  SpO2: 97 % (07/28/25 2315) Vital Signs (24h Range):  Temp:  [97.7 °F (36.5 °C)] 97.7 °F (36.5 °C)  Pulse:  [] 100  Resp:  [14-18] 18  SpO2:  [97 %] 97 %  BP: (141-180)/() 164/87     Weight: 99.8 kg (220 lb)  Body mass index is 26.78 kg/m².     Physical Exam  Vitals reviewed.   Constitutional:       General: He is awake. He is not in acute distress.     Appearance: Normal appearance. He is not  ill-appearing or diaphoretic.   Cardiovascular:      Rate and Rhythm: Tachycardia present. Rhythm irregularly irregular.      Heart sounds: Normal heart sounds. No murmur heard.     No friction rub. No gallop.      Comments: Atrial fibrillation noted on monitor during interview and exam.  Pulmonary:      Effort: Pulmonary effort is normal. No accessory muscle usage or respiratory distress.      Breath sounds: Normal breath sounds. No wheezing, rhonchi or rales.   Abdominal:      General: Abdomen is flat. Bowel sounds are normal.      Palpations: Abdomen is soft.      Tenderness: There is no abdominal tenderness. There is no guarding or rebound.   Musculoskeletal:      Right lower leg: No edema.      Left lower leg: No edema.   Skin:     General: Skin is warm and dry.   Neurological:      Mental Status: He is alert and oriented to person, place, and time.   Psychiatric:         Behavior: Behavior is cooperative.                Significant Labs: All pertinent labs within the past 24 hours have been reviewed.  CBC:   Recent Labs   Lab 07/28/25 2022   WBC 8.83   HGB 16.5   HCT 47.6        CMP:   Recent Labs   Lab 07/28/25 2022      K 4.0      CO2 26   *   BUN 15   CREATININE 1.0   CALCIUM 9.4   PROT 8.3   ALBUMIN 5.3*   BILITOT 0.7   ALKPHOS 69   AST 20   ALT 18   ANIONGAP 8     Cardiac Markers:   Recent Labs   Lab 07/28/25 2022   BNP 8     Coagulation:   Recent Labs   Lab 07/28/25 2022   INR 1.0     Magnesium:   Recent Labs   Lab 07/28/25 2022   MG 2.1     TSH:   Recent Labs   Lab 07/28/25 2024   TSH 1.234       Significant Imaging:   Imaging Results              X-Ray Chest AP Portable (Final result)  Result time 07/28/25 21:42:46      Final result by Augie Sommers MD (07/28/25 21:42:46)                   Impression:      No evidence of acute cardiopulmonary process.      Electronically signed by: Augie Sommers  Date:    07/28/2025  Time:    21:42               Narrative:     EXAMINATION:  XR CHEST AP PORTABLE    CLINICAL HISTORY:  afib;    TECHNIQUE:  Single frontal view of the chest was performed.    COMPARISON:  Radiographs of the chest from 02/18/2009    FINDINGS:  Heart size and pulmonary vasculature are within normal limits.  No evidence of focal consolidation, pneumothorax, or pleural effusion.  No evidence of acute osseous process.                                     Assessment/Plan:     Assessment & Plan  Atrial fibrillation with RVR  Patient has new onset atrial fibrillation. Patient is currently in atrial fibrillation. [id number] Score: The patient doesn't have any registry metric data available. The patients heart rate in the last 24 hours is as follows:  Pulse  Min: 91  Max: 121     Antiarrhythmics  diltiaZEM 100 mg in dextrose 5% 100 mL IVPB (ready to mix) (titrating), Continuous, Intravenous    Anticoagulants  enoxaparin injection 100 mg, Every 12 hours, Subcutaneous    Plan  - Replete lytes with a goal of K>4, Mg >2  - Patient is anticoagulated, see medications listed above.  - Patient's afib is currently uncontrolled. Will adjust treatment as follows:  Patient received diltiazem 10 mg IV x2 in the ED with improvement of heart rate.  However, patient's heart rate is slowly trending upwards.  Will add diltiazem titrating gtt at this time.  - Full Dose lovenox ordered  - Cardiology consulted, appreciate recs  - NPO at midnight  - Trend trops  - TTE pending  - TSH WNL  - Lipid and A1c pending  - Continuous tele monitoring  Primary hypertension  Chronic, uncontrolled.  Latest blood pressure and vitals reviewed-     Temp:  [97.7 °F (36.5 °C)]   Pulse:  []   Resp:  [14-18]   BP: (141-180)/()   SpO2:  [97 %] .   Home meds for hypertension were reviewed and noted below-  Hypertension Medications              amLODIPine (NORVASC) 5 MG tablet Take 1 tablet (5 mg total) by mouth once daily.            While in the hospital, will manage blood pressure as follows; Adjust  home antihypertensive regimen as follows- holding home amlodipine while patient is on diltiazem gtt, resume once appropriate    Will utilize p.r.n. blood pressure medication only if patient's blood pressure greater than 180/110 and he develops symptoms such as worsening chest pain or shortness of breath.    - Patient does not seem to be compliant with home medications per med rec completed by pharmacy.    VTE Risk Mitigation (From admission, onward)           Ordered     enoxaparin injection 100 mg  Every 12 hours         07/28/25 2320     IP VTE LOW RISK PATIENT  Once         07/28/25 2320     Place sequential compression device  Until discontinued         07/28/25 2320                      On 07/28/2025, patient should be placed in hospital observation services under my care in collaboration with Azeem Ramsay MD.      Critical care time spent on the evaluation and treatment of severe organ dysfunction, review of pertinent labs and imaging studies, discussions with consulting providers and discussions with patient/family 40 minutes          Bambi Thacker PA-C  Department of Hospital Medicine  [facility] - Emergency Dept

## 2025-07-29 NOTE — PHARMACY MED REC
"Admission Medication History     The home medication history was taken by Kana Whitlock.    You may go to "Admission" then "Reconcile Home Medications" tabs to review and/or act upon these items.     The home medication list has been updated by the Pharmacy department.   Please read ALL comments highlighted in yellow.   Please address this information as you see fit.    Feel free to contact us if you have any questions or require assistance.      The medications listed below were removed from the home medication list. Please reorder if appropriate:  Patient reports no longer taking the following medication(s):  Flonase   Cetirizine 10      Medications listed below were obtained from: Patient/family and Analytic software- [employer]  Medications Ordered Prior to Encounter[1]    Potential issues to be addressed PRIOR TO DISCHARGE  Please discuss with the patient barriers to adherence with medication treatment plans  Patient requires education regarding drug therapies     Kana Whitlock  [id number]                .               [1]   No current facility-administered medications on file prior to encounter.     Current Outpatient Medications on File Prior to Encounter   Medication Sig Dispense Refill    amLODIPine (NORVASC) 5 MG tablet Take 1 tablet (5 mg total) by mouth once daily. 90 tablet 3    sertraline (ZOLOFT) 25 MG tablet Take 1 tablet (25 mg total) by mouth once daily. 90 tablet 3    [DISCONTINUED] cetirizine (ZYRTEC) 10 MG tablet Take 1 tablet (10 mg total) by mouth once daily. 30 tablet 1    [DISCONTINUED] fluticasone (FLONASE) 50 mcg/actuation nasal spray 1 spray (50 mcg total) by Each Nare route once daily. 9.9 mL 0     "

## 2025-07-29 NOTE — PLAN OF CARE
[facility]  Initial Discharge Assessment       Primary Care Provider: Felicia Aviles NP    Admission Diagnosis: Atrial fibrillation with RVR [I48.91]    Admission Date: 7/28/2025  Expected Discharge Date: 7/30/2025    Transition of Care Barriers: (P) None    SW met with patient bedside. Patient verified demographics, insurance, supports, and PCP.  Patient lives alone. SW assessed patient's needs. Patient is able to complete ADLs independently. Patient verified No home DME, No HH, No Dialysis, No Blood Thinners, and No Oxygen.     Patient verified pharmacy of choice: [facility]. Opted-in to meds to bed.   Patient Father will be source of transportation at the time of discharge.       Payor: [employer] / Plan: [employer] [facility] / Product Type: Government /     Extended Emergency Contact Information  Primary Emergency Contact: Dave Vasques  Mobile Phone: [phone]  Relation: Father  Preferred language: English   needed? No    Discharge Plan A: (P) Home  Discharge Plan B: (P) Home      [id number] [place] [facility] [address]  [address]  [place] [address]  Phone: [phone] Fax: [phone]      Initial Assessment (most recent)       Adult Discharge Assessment - 07/29/25 1142          Discharge Assessment    Assessment Type Discharge Planning Assessment (P)      Confirmed/corrected address, phone number and insurance Yes (P)      Confirmed Demographics Correct on Facesheet (P)      Source of Information patient (P)      Communicated CECILIO with patient/caregiver Yes (P)      People in Home alone (P)      Facility Arrived From: [facility] (P)      Do you expect to return to your current living situation? Yes (P)      Do you have help at home or someone to help you manage your care at home? No (P)      Prior to hospitilization cognitive status: Alert/Oriented (P)      Current cognitive status: Alert/Oriented (P)      Walking or Climbing Stairs Difficulty no (P)       Dressing/Bathing Difficulty no (P)      Equipment Currently Used at Home none (P)      Patient currently being followed by outpatient case management? No (P)      Do you currently have service(s) that help you manage your care at home? No (P)      Do you take prescription medications? Yes (P)      Do you have prescription coverage? Yes (P)      Do you have any problems affording any of your prescribed medications? No (P)      Who is going to help you get home at discharge? Father (P)      How do you get to doctors appointments? car, drives self (P)      Are you on dialysis? No (P)      Do you take coumadin? No (P)      Discharge Plan A Home (P)      Discharge Plan B Home (P)      DME Needed Upon Discharge  none (P)      Discharge Plan discussed with: Patient (P)      Transition of Care Barriers None (P)

## 2025-07-29 NOTE — SUBJECTIVE & OBJECTIVE
Interval History:  Patient seen and examined, no acute complaints    Review of Systems   Constitutional:  Negative for chills, diaphoresis and fever.   Respiratory:  Negative for shortness of breath.    Cardiovascular:  Negative for chest pain and palpitations.   Gastrointestinal:  Negative for abdominal pain, diarrhea, nausea and vomiting.   Genitourinary:  Negative for dysuria and hematuria.   Neurological:  Negative for dizziness, syncope and light-headedness.     Objective:     Vital Signs (Most Recent):  Temp: 97.7 °F (36.5 °C) (07/29/25 1105)  Pulse: 89 (07/29/25 1000)  Resp: 16 (07/29/25 1000)  BP: (!) 144/79 (07/29/25 1000)  SpO2: 97 % (07/29/25 1000) Vital Signs (24h Range):  Temp:  [97.7 °F (36.5 °C)-98.5 °F (36.9 °C)] 97.7 °F (36.5 °C)  Pulse:  [] 89  Resp:  [14-27] 16  SpO2:  [95 %-98 %] 97 %  BP: (119-180)/() 144/79     Weight: 99.8 kg (220 lb 0.3 oz)  Body mass index is 26.78 kg/m².    Intake/Output Summary (Last 24 hours) at 7/29/2025 1507  Last data filed at 7/29/2025 1428  Gross per 24 hour   Intake 790 ml   Output 1700 ml   Net -910 ml         Physical Exam  Vitals reviewed.   Constitutional:       General: He is awake. He is not in acute distress.     Appearance: Normal appearance. He is not ill-appearing or diaphoretic.   Cardiovascular:      Rate and Rhythm: Normal rate. Rhythm irregular.      Heart sounds: Normal heart sounds. No murmur heard.     No friction rub. No gallop.   Pulmonary:      Effort: Pulmonary effort is normal. No accessory muscle usage or respiratory distress.      Breath sounds: Normal breath sounds. No wheezing, rhonchi or rales.   Abdominal:      General: Abdomen is flat. Bowel sounds are normal.      Palpations: Abdomen is soft.      Tenderness: There is no abdominal tenderness. There is no guarding or rebound.   Musculoskeletal:      Right lower leg: No edema.      Left lower leg: No edema.   Skin:     General: Skin is warm and dry.   Neurological:      Mental  Status: He is alert and oriented to person, place, and time.   Psychiatric:         Behavior: Behavior is cooperative.               Significant Labs: All pertinent labs within the past 24 hours have been reviewed.  Recent Lab Results  (Last 5 results in the past 24 hours)        07/29/25  0945   07/29/25  0926   07/29/25  0356   07/29/25  0015   07/28/25 2024        Phencyclidine   Negative             Amphetamines, Urine   Negative             Anion Gap     14           Barbituates, Urine   Negative             Baso #     0.12           Basophil %     1.2           Benzodiazepine, Urine   Negative             BUN     14           Calcium     9.0           Chloride     106           Cholesterol Total     131  Comment: The National Cholesterol Education Program (NCEP) has set the  following guidelines (reference ranges) for Cholesterol:  Optimal.....................<200 mg/dL  Borderline High.............200-239 mg/dL  High........................> or = 240 mg/dL           CO2     23           Cocaine, Urine   Negative             Creatinine     0.9           Urine Creatinine   136.9  Comment: The random urine reference ranges provided were established   for 24 hour urine collections.  No reference ranges exist for  random urine specimens.  Correlate clinically.             eGFR     >60           Eos #     0.22           Eos %     2.3           Estimated Avg Glucose     88           Glucose     103           HDL     49  Comment: The National Cholesterol Education Program (NCEP) has set the  following guidelines (reference values) for HDL Cholesterol:  Low...............<40 mg/dL  Optimal...........>60 mg/dL           HDL/Cholesterol Ratio     37.4           Hematocrit     45.0           Hemoglobin     15.6           Hemoglobin A1C External     4.7  Comment: According to ADA guidelines, hemoglobin A1C <7.0% represents  optimal control in non-pregnant diabetic patients.  Different  metrics may apply to specific  populations.      Standards of Medical Care in Diabetes - 2016.    For the purpose of screening for the presence of diabetes:  <5.7%     Consistent with the absence of diabetes  5.7-6.4%  Consistent with increasing risk for diabetes             (prediabetes)  >or=6.5%  Consistent with diabetes    Currently no consensus exists for use of hemoglobin A1C  for diagnosis of diabetes for children.           Immature Grans (Abs)     0.04  Comment: Mild elevation in immature granulocytes is non specific and can be seen in a variety of conditions including stress response, acute inflammation, trauma and pregnancy. Correlation with other laboratory and clinical findings is essential.           Immature Granulocytes     0.4           LDL Cholesterol     67.6  Comment: The National Cholesterol Education Program (NCEP) has set the  following guidelines (reference values) for LDL Cholesterol:  Optimal.......................<130 mg/dL  Borderline High...............130-159 mg/dL  High..........................160-189 mg/dL  Very High.....................>190 mg/dL             Lymph #     4.10           LYMPH %     42.5           Magnesium      2.3           MCH     29.4           MCHC     34.7           MCV     85           Mono #     0.78           Mono %     8.1           MPV     10.0           Neut #     4.4           Neut %     45.5           Non-HDL Cholesterol     82  Comment: Risk category and Non-HDL cholesterol goals:  Coronary heart disease (CHD)or equivalent (10-year risk of CHD >20%):  Non-HDL cholesterol goal     <130 mg/dL  Two or more CHD risk factors and 10-year risk of CHD <= 20%:  Non-HDL cholesterol goal     <160 mg/dL  0 to 1 CHD risk factor:  Non-HDL cholesterol goal     <190 mg/dL           nRBC     0           Opiates, Urine   Negative             Phosphorus Level     4.5           Platelet Count     307           Potassium     3.8           RBC     5.31           RDW     12.8           Sodium     143            Marijuana (THC) Metabolite   Negative             Total Cholesterol/HDL Ratio     2.7           Triglycerides     72  Comment: The National Cholesterol Education Program (NCEP) has set the  following guidelines (reference values) for triglycerides:  Normal......................<150 mg/dL  Borderline High.............150-199 mg/dL  High........................200-499 mg/dL             Troponin I High Sensitivity 2.4  Comment: Troponin results differ between methods. Do not use   results between Troponin methods interchangeably.    Alkaline Phospatase levels above 400 U/L may   cause false positive results.    Access hsTnI should not be used for patients taking   Asfotase keena (Strensiq).     5.1  Comment: Troponin results differ between methods. Do not use   results between Troponin methods interchangeably.    Alkaline Phospatase levels above 400 U/L may   cause false positive results.    Access hsTnI should not be used for patients taking   Asfotase keena (Strensiq).   5.8  Comment: Troponin results differ between methods. Do not use   results between Troponin methods interchangeably.    Alkaline Phospatase levels above 400 U/L may   cause false positive results.    Access hsTnI should not be used for patients taking   Asfotase keena (Strensiq).         TSH         1.234       WBC     9.64                                  Significant Imaging: I have reviewed all pertinent imaging results/findings within the past 24 hours.

## 2025-07-29 NOTE — ASSESSMENT & PLAN NOTE
Chronic, uncontrolled.  Latest blood pressure and vitals reviewed-     Temp:  [97.7 °F (36.5 °C)-98.5 °F (36.9 °C)]   Pulse:  []   Resp:  [14-27]   BP: (119-180)/()   SpO2:  [95 %-98 %] .   Home meds for hypertension were reviewed and noted below-  Hypertension Medications              amLODIPine (NORVASC) 5 MG tablet Take 1 tablet (5 mg total) by mouth once daily.            While in the hospital, will manage blood pressure as follows; Adjust home antihypertensive regimen as follows- holding home amlodipine while patient is on diltiazem gtt, resume once appropriate    Will utilize p.r.n. blood pressure medication only if patient's blood pressure greater than 180/110 and he develops symptoms such as worsening chest pain or shortness of breath.    - Patient does not seem to be compliant with home medications per med rec completed by pharmacy.

## 2025-07-30 ENCOUNTER — TELEPHONE (OUTPATIENT)
Dept: CARDIOLOGY | Facility: CLINIC | Age: 25
End: 2025-07-30
Payer: OTHER GOVERNMENT

## 2025-07-30 VITALS
TEMPERATURE: 98 F | DIASTOLIC BLOOD PRESSURE: 81 MMHG | WEIGHT: 220 LBS | RESPIRATION RATE: 16 BRPM | HEART RATE: 77 BPM | OXYGEN SATURATION: 96 % | SYSTOLIC BLOOD PRESSURE: 144 MMHG | BODY MASS INDEX: 26.79 KG/M2 | HEIGHT: 76 IN

## 2025-07-30 PROBLEM — Z72.0 TOBACCO USE: Status: ACTIVE | Noted: 2025-07-30

## 2025-07-30 PROBLEM — I48.0 PAF (PAROXYSMAL ATRIAL FIBRILLATION): Status: ACTIVE | Noted: 2025-07-30

## 2025-07-30 PROBLEM — I48.91 ATRIAL FIBRILLATION WITH RVR: Status: RESOLVED | Noted: 2025-07-28 | Resolved: 2025-07-30

## 2025-07-30 LAB
ABSOLUTE EOSINOPHIL (SMH): 0.26 K/UL
ABSOLUTE MONOCYTE (SMH): 0.88 K/UL (ref 0.3–1)
ABSOLUTE NEUTROPHIL COUNT (SMH): 4.4 K/UL (ref 1.8–7.7)
ANION GAP (SMH): 10 MMOL/L (ref 8–16)
BASOPHILS # BLD AUTO: 0.12 K/UL
BASOPHILS NFR BLD AUTO: 1.2 %
BUN SERPL-MCNC: 15 MG/DL (ref 6–20)
CALCIUM SERPL-MCNC: 9.7 MG/DL (ref 8.7–10.5)
CHLORIDE SERPL-SCNC: 103 MMOL/L (ref 95–110)
CO2 SERPL-SCNC: 26 MMOL/L (ref 23–29)
CREAT SERPL-MCNC: 1 MG/DL (ref 0.5–1.4)
ERYTHROCYTE [DISTWIDTH] IN BLOOD BY AUTOMATED COUNT: 12.8 % (ref 11.5–14.5)
GFR SERPLBLD CREATININE-BSD FMLA CKD-EPI: >60 ML/MIN/1.73/M2
GLUCOSE SERPL-MCNC: 87 MG/DL (ref 70–110)
HCT VFR BLD AUTO: 48.9 % (ref 40–54)
HGB BLD-MCNC: 16.7 GM/DL (ref 14–18)
IMM GRANULOCYTES # BLD AUTO: 0.04 K/UL (ref 0–0.04)
IMM GRANULOCYTES NFR BLD AUTO: 0.4 % (ref 0–0.5)
LYMPHOCYTES # BLD AUTO: 4.39 K/UL (ref 1–4.8)
MAGNESIUM SERPL-MCNC: 2.2 MG/DL (ref 1.6–2.6)
MCH RBC QN AUTO: 29.5 PG (ref 27–31)
MCHC RBC AUTO-ENTMCNC: 34.2 G/DL (ref 32–36)
MCV RBC AUTO: 86 FL (ref 82–98)
NUCLEATED RBC (/100WBC) (SMH): 0 /100 WBC
OHS QRS DURATION: 94 MS
OHS QTC CALCULATION: 413 MS
PHOSPHATE SERPL-MCNC: 4.7 MG/DL (ref 2.7–4.5)
PLATELET # BLD AUTO: 295 K/UL (ref 150–450)
PMV BLD AUTO: 10 FL (ref 9.2–12.9)
POTASSIUM SERPL-SCNC: 4.1 MMOL/L (ref 3.5–5.1)
RBC # BLD AUTO: 5.66 M/UL (ref 4.6–6.2)
RELATIVE EOSINOPHIL (SMH): 2.6 % (ref 0–8)
RELATIVE LYMPHOCYTE (SMH): 43.5 % (ref 18–48)
RELATIVE MONOCYTE (SMH): 8.7 % (ref 4–15)
RELATIVE NEUTROPHIL (SMH): 43.6 % (ref 38–73)
SODIUM SERPL-SCNC: 139 MMOL/L (ref 136–145)
WBC # BLD AUTO: 10.09 K/UL (ref 3.9–12.7)

## 2025-07-30 PROCEDURE — 84100 ASSAY OF PHOSPHORUS: CPT

## 2025-07-30 PROCEDURE — 25000003 PHARM REV CODE 250: Performed by: NURSE PRACTITIONER

## 2025-07-30 PROCEDURE — 93005 ELECTROCARDIOGRAM TRACING: CPT | Performed by: INTERNAL MEDICINE

## 2025-07-30 PROCEDURE — 25000003 PHARM REV CODE 250: Performed by: FAMILY MEDICINE

## 2025-07-30 PROCEDURE — 20600001 HC STEP DOWN PRIVATE ROOM

## 2025-07-30 PROCEDURE — 63600175 PHARM REV CODE 636 W HCPCS

## 2025-07-30 PROCEDURE — 84295 ASSAY OF SERUM SODIUM: CPT

## 2025-07-30 PROCEDURE — 85025 COMPLETE CBC W/AUTO DIFF WBC: CPT

## 2025-07-30 PROCEDURE — 36415 COLL VENOUS BLD VENIPUNCTURE: CPT

## 2025-07-30 PROCEDURE — 93010 ELECTROCARDIOGRAM REPORT: CPT | Mod: ,,, | Performed by: INTERNAL MEDICINE

## 2025-07-30 PROCEDURE — 25000003 PHARM REV CODE 250

## 2025-07-30 PROCEDURE — 83735 ASSAY OF MAGNESIUM: CPT

## 2025-07-30 RX ORDER — METOPROLOL TARTRATE 25 MG/1
12.5 TABLET, FILM COATED ORAL 2 TIMES DAILY
Qty: 30 TABLET | Refills: 2 | Status: SHIPPED | OUTPATIENT
Start: 2025-07-30

## 2025-07-30 RX ORDER — AMLODIPINE BESYLATE 5 MG/1
5 TABLET ORAL DAILY
Status: DISCONTINUED | OUTPATIENT
Start: 2025-07-30 | End: 2025-07-30 | Stop reason: HOSPADM

## 2025-07-30 RX ORDER — FLECAINIDE ACETATE 100 MG/1
100 TABLET ORAL EVERY 12 HOURS
Qty: 60 TABLET | Refills: 2 | Status: SHIPPED | OUTPATIENT
Start: 2025-07-30

## 2025-07-30 RX ADMIN — ENOXAPARIN SODIUM 100 MG: 100 INJECTION SUBCUTANEOUS at 01:07

## 2025-07-30 RX ADMIN — AMLODIPINE BESYLATE 5 MG: 5 TABLET ORAL at 10:07

## 2025-07-30 RX ADMIN — SERTRALINE HYDROCHLORIDE 25 MG: 25 TABLET ORAL at 08:07

## 2025-07-30 RX ADMIN — FLECAINIDE ACETATE 100 MG: 100 TABLET ORAL at 08:07

## 2025-07-30 RX ADMIN — METOPROLOL TARTRATE 12.5 MG: 25 TABLET, FILM COATED ORAL at 08:07

## 2025-07-30 NOTE — PLAN OF CARE
Hospital follow up appointment with discharge clinic scheduled. Cardiology follow up requested via [employer]. Both added to AVS for reference. No further needs known at this time. Pt clear from case management to discharge home with significant other, Sonia, to provide transport.      07/30/25 1237   Final Note   Assessment Type Final Discharge Note   Anticipated Discharge Disposition Home   What phone number can be called within the next 1-3 days to see how you are doing after discharge? [phone]   Hospital Resources/Appts/Education Provided Provided patient/caregiver with written discharge plan information;Appointments scheduled and added to AVS   Post-Acute Status   Discharge Delays None known at this time

## 2025-07-30 NOTE — PLAN OF CARE
Appointment requested from discharge clinic via secure chat.      07/30/25 0963   Post-Acute Status   Hospital Resources/Appts/Education Provided Appointments scheduled and added to AVS

## 2025-07-30 NOTE — TELEPHONE ENCOUNTER
----- Message from  Sherri sent at 7/30/2025 10:01 AM CDT -----  Regarding: [facility]  Pt expected to discharge soon. Will need hospital follow up appointment. Please contact pt with appointment date and time. Thank you, Sherri Her RN CM

## 2025-07-30 NOTE — ASSESSMENT & PLAN NOTE
Patient has paroxysmal (<7 days) atrial fibrillation. Patient is currently in sinus rhythm. [id number] Score: The patient doesn't have any registry metric data available. The patients heart rate in the last 24 hours is as follows:  Pulse  Min: 73  Max: 92     Antiarrhythmics  metoprolol tartrate (LOPRESSOR) split tablet 12.5 mg, 2 times daily, Oral  flecainide tablet 100 mg, Every 12 hours, Oral  flecainide (TAMBOCOR) tablet, Every 12 hours, Oral  metoprolol tartrate (LOPRESSOR) tablet, 2 times daily, Oral    Anticoagulants  enoxaparin injection 100 mg, Every 12 hours, Subcutaneous  apixaban tablet, 2 times daily, Oral    Plan  - Replete lytes with a goal of K>4, Mg >2  - Patient is anticoagulated, see medications listed above.  - Patient's afib is currently controlled  - discharge with p.o. apixaban for least 3 months, follow up closely with Cardiology.

## 2025-07-30 NOTE — ASSESSMENT & PLAN NOTE
Patient admits to vaping  -unclear if evidence is 4 against this but recommended patient's stopped given young onset of atrial fibrillation.  -denies regular alcohol or illicit substance use.

## 2025-07-30 NOTE — DISCHARGE SUMMARY
[facility] Medicine  Discharge Summary      Patient Name: Mc Vasques  MRN: [id number]  [facility]: [id number]  Patient Class: IP- Inpatient  Admission Date: 7/28/2025  Hospital Length of Stay: 0 days  Discharge Date and Time: 07/30/2025 1:24 PM  Attending Physician: Noemí Bolden DO   Discharging Provider: Noemí Bolden DO  Primary Care Provider: Felicia Aviles NP    Primary Care Team: Networked reference to record PCT     HPI:   Mr. Vasques is a 25 yr old male with a hx of HTN and anxiety that presented to the ED with a CC of palpitations that began about 10 minutes PTA to ED. patient endorses that he was in the bathtub when he began having palpitations.  He states that he tried drinking 3 bottles of water and relaxing prior to coming to the ED for evaluation.  He states that they lasted about 45 minutes before resolving.  He denies ever having a history of AFib.  He denies excessive caffeine use and states that he occasionally will drink tea but mostly drinks Powerade and water.  He states he vapes daily and occasionally will drink alcohol maybe 1 or 2 weekends a month.  He denies recreational drug use.  He denies fever, chills, dyspnea, diaphoresis, chest pain, abdominal pain, nausea, vomiting, diarrhea, dysuria, hematuria, lightheadedness, dizziness, and syncope.    Upon arrival to ED, patient afebrile, HR of 121, RR of 18, BP of 169/93, Satting 97% on RA. Workup in the ED revealed glucose of 117, albumin of 5.3.  Remainder of labs unremarkable.  CXR shows no evidence of acute cardiopulmonary process.  Patient was given diltiazem 10 mg IV x2,  mL bolus, Mag sulfate 2 g IV while in the ED. ED provider discussed case with Cardiology who recommended admission and will see patient tomorrow.  Patient will be placed under observation for further management.       * No surgery found *      Hospital Course:   25 year old male patient with a PMH significant for HTN and anxiety  admitted to ER with Palpitations. He was found to be in AFIB.  Only known risk factor as he says that he vapes.  Seen and evaluated by Cardiology and patient was converted to sinus rhythm with flecainide overnight.  Continue p.o. flecainide, metoprolol, apixaban on discharge and follow up with Cardiology.  Echocardiogram was performed and EF 55-60%, normal wall motion.  He does elicit a history of vaping and he has been advised to stop.     Goals of Care Treatment Preferences:  Code Status: Full Code         Consults:   Consults (From admission, onward)          Status Ordering Provider     Inpatient consult to Cardiology  Once        Provider:  Ketty Caruso MD    Completed ROMELIA NAVARRO            Assessment & Plan  Primary hypertension  Chronic, uncontrolled.  Latest blood pressure and vitals reviewed-     Temp:  [97.6 °F (36.4 °C)-98.5 °F (36.9 °C)]   Pulse:  [73-92]   Resp:  [14-24]   BP: (123-166)/(61-86)   SpO2:  [95 %-98 %] .   Home meds for hypertension were reviewed and noted below-  Hypertension Medications              amLODIPine (NORVASC) 5 MG tablet Take 1 tablet (5 mg total) by mouth once daily.            While in the hospital, will manage blood pressure as follows; Adjust home antihypertensive regimen as follows- holding home amlodipine while patient is on diltiazem gtt, resume once appropriate    Will utilize p.r.n. blood pressure medication only if patient's blood pressure greater than 180/110 and he develops symptoms such as worsening chest pain or shortness of breath.    - Patient does not seem to be compliant with home medications per med rec completed by pharmacy.    PAF (paroxysmal atrial fibrillation)  Patient has paroxysmal (<7 days) atrial fibrillation. Patient is currently in sinus rhythm. [id number] Score: The patient doesn't have any registry metric data available. The patients heart rate in the last 24 hours is as follows:  Pulse  Min: 73  Max: 92      Antiarrhythmics  metoprolol tartrate (LOPRESSOR) split tablet 12.5 mg, 2 times daily, Oral  flecainide tablet 100 mg, Every 12 hours, Oral  flecainide (TAMBOCOR) tablet, Every 12 hours, Oral  metoprolol tartrate (LOPRESSOR) tablet, 2 times daily, Oral    Anticoagulants  enoxaparin injection 100 mg, Every 12 hours, Subcutaneous  apixaban tablet, 2 times daily, Oral    Plan  - Replete lytes with a goal of K>4, Mg >2  - Patient is anticoagulated, see medications listed above.  - Patient's afib is currently controlled  - discharge with p.o. apixaban for least 3 months, follow up closely with Cardiology.      Tobacco use  Patient admits to vaping  -unclear if evidence is 4 against this but recommended patient's stopped given young onset of atrial fibrillation.  -denies regular alcohol or illicit substance use.      Final Active Diagnoses:    Diagnosis Date Noted POA    PAF (paroxysmal atrial fibrillation) [I48.0] 07/30/2025 Yes    Tobacco use [Z72.0] 07/30/2025 Yes    Primary hypertension [I10] 2024 Yes      Problems Resolved During this Admission:    Diagnosis Date Noted Date Resolved POA    PRINCIPAL PROBLEM:  Atrial fibrillation with RVR [I48.91] 07/28/2025 07/30/2025 Yes       Discharged Condition: good    Disposition: Home or Self Care    Follow Up:   Follow-up Information       Williams Hernandez FNP. Go on 8/8/2025.    Specialty: Family Medicine  Why: Hospital follow up scheduled at 11:00 a.m.  Contact information:  [address]  [address]  [place] [address]  [phone]               Domingo Mckeon MD Follow up.    Specialties: Cardiovascular Disease, Cardiology  Why: In basket message sent to clinic. Clinic to contact pt with appointment date and time. If the clinic has not contacted you within 3 days of discharge to schedule, please contact them to schedule.  Contact information:  [address]  [address]  [place] [address] [phone]                           Patient Instructions:      Notify  "your health care provider if you experience any of the following:  temperature >100.4     Notify your health care provider if you experience any of the following:  persistent nausea and vomiting or diarrhea     Notify your health care provider if you experience any of the following:  severe uncontrolled pain     Notify your health care provider if you experience any of the following:  difficulty breathing or increased cough     Notify your health care provider if you experience any of the following:  persistent dizziness, light-headedness, or visual disturbances     Activity as tolerated       Significant Diagnostic Studies: Labs: BMP:   Recent Labs   Lab 07/28/25 2022 07/29/25 0356 07/30/25  0326   * 103 87    143 139   K 4.0 3.8 4.1    106 103   CO2 26 23 26   BUN 15 14 15   CREATININE 1.0 0.9 1.0   CALCIUM 9.4 9.0 9.7   MG 2.1 2.3 2.2   , CMP   Recent Labs   Lab 07/28/25 2022 07/29/25 0356 07/30/25 0326    143 139   K 4.0 3.8 4.1    106 103   CO2 26 23 26   * 103 87   BUN 15 14 15   CREATININE 1.0 0.9 1.0   CALCIUM 9.4 9.0 9.7   PROT 8.3  --   --    ALBUMIN 5.3*  --   --    BILITOT 0.7  --   --    ALKPHOS 69  --   --    AST 20  --   --    ALT 18  --   --    ANIONGAP 8 14 10   , CBC   Recent Labs   Lab 07/28/25 2022 07/29/25 0356 07/30/25 0326   WBC 8.83 9.64 10.09   HGB 16.5 15.6 16.7   HCT 47.6 45.0 48.9    307 295   , INR   Lab Results   Component Value Date    INR 1.0 07/28/2025    PROTIME 10.9 07/28/2025   , Lipid Panel   Lab Results   Component Value Date    CHOL 131 07/29/2025    HDL 49 07/29/2025    LDLCALC 67.6 07/29/2025    TRIG 72 07/29/2025    CHOLHDL 37.4 07/29/2025   , Troponin No results for input(s): "TROPONINI" in the last 168 hours., A1C:   Recent Labs   Lab 07/29/25  0356   HGBA1C 4.7   , and All labs within the past 24 hours have been reviewed  Radiology:   Imaging Results              X-Ray Chest AP Portable (Final result)  Result time " 07/28/25 21:42:46      Final result by Augie Sommers MD (07/28/25 21:42:46)                   Impression:      No evidence of acute cardiopulmonary process.      Electronically signed by: Augie Sommers  Date:    07/28/2025  Time:    21:42               Narrative:    EXAMINATION:  XR CHEST AP PORTABLE    CLINICAL HISTORY:  afib;    TECHNIQUE:  Single frontal view of the chest was performed.    COMPARISON:  Radiographs of the chest from 02/18/2009    FINDINGS:  Heart size and pulmonary vasculature are within normal limits.  No evidence of focal consolidation, pneumothorax, or pleural effusion.  No evidence of acute osseous process.                                      Cardiac Graphics: Echocardiogram: 2D echo with color flow doppler: No results found for this or any previous visit. and Transthoracic echo (TTE) complete (Cupid Only):   Results for orders placed or performed during the hospital encounter of 07/28/25   Echo   Result Value Ref Range    BSA 2.31 m2    OHS CV CPX PATIENT HEIGHT IN 76     Dahl's Biplane MOD Ejection Fraction 57 %    A2C EF 57 %    A4C EF 60 %    LVIDd 4.4 3.5 - 6.0 cm    LV Systolic Volume 30 mL    LV Systolic Volume Index 13.0 mL/m2    LVIDs 2.8 2.1 - 4.0 cm    LV ESV A2C 60.60 mL    LV Diastolic Volume 85 mL    LV ESV A4C 37.40 mL    LV Diastolic Volume Index 36.80 mL/m2    LV EDV A2C 123 mL    LV EDV A4C 135.00 mL    Left Ventricular End Systolic Volume by Teichholz Method 29.55 mL    Left Ventricular End Diastolic Volume by Teichholz Method 85.36 mL    IVS 1.3 (A) 0.6 - 1.1 cm    LVOT diameter 2.4 cm    LVOT area 4.5 cm2    FS 36.4 28 - 44 %    Left Ventricle Relative Wall Thickness 0.59 cm    PW 1.3 (A) 0.6 - 1.1 cm    LV mass 215.1 g    LV Mass Index 93.1 g/m2    MV Peak E Jaylen 0.82 m/s    TDI LATERAL 0.18 m/s    TDI SEPTAL 0.13 m/s    E/E' ratio 5 m/s    LV SEPTAL E/E' RATIO 6.3 m/s    LV LATERAL E/E' RATIO 4.6 m/s    LVOT peak jaylen 0.9 m/s    Left Ventricular Outflow  Tract Mean Velocity 0.61 cm/s    Left Ventricular Outflow Tract Mean Gradient 2.00 mmHg    RV S' 10.10 cm/s    TAPSE 2.1 cm    LA Vol (MOD) 50 mL    MARIAM (MOD) 22 mL/m2    RA area length vol 49.90 mL    RA vol index 21.60 mL/m2    AV mean gradient 3 mmHg    AV peak gradient 6 mmHg    Ao peak zheng 1.2 m/s    Ao VTI 20.8 cm    LVOT peak VTI 13.6 cm    AV valve area 3.0 cm²    AV Velocity Ratio 0.75     AV index (prosthetic) 0.65     JNOO by Velocity Ratio 3.4 cm²    MV mean gradient 1 mmHg    MV peak gradient 3 mmHg    MV valve area by continuity eq 3.40 cm2    MV VTI 18.1 cm    PV PEAK VELOCITY 0.94 m/s    PV peak gradient 4 mmHg    Pulmonary Valve Mean Velocity 0.68 m/s    Ao root annulus 3.5 cm    Ascending aorta 2.7 cm    ASI 1.2 cm/m2    Aortic Height Index 1.4 cm/m    IVC diameter 1.35 cm    Mean e' 0.16 m/s    ZLVIDS -5.30     ZLVIDD -7.25     LA area A4C 17.50 cm2    LA area A2C 21.20 cm2    AORTIC VALVE CUSP SEPERATION 2.50 cm    Est. RA pres 8 mmHg    Narrative      Left Ventricle: The left ventricle is normal in size. Normal wall   thickness. There is mild concentric hypertrophy. There is normal systolic   function with a visually estimated ejection fraction of 55 - 60%.   Quantitated ejection fraction is 57%. Unable to assess diastolic function   due to atrial fibrillation.    Right Ventricle: The right ventricle is normal in size. Systolic   function is normal.    Mitral Valve: There is mild regurgitation.    Tricuspid Valve: There is mild regurgitation.    IVC/SVC: Intermediate venous pressure at 8 mmHg.         Pending Diagnostic Studies:       Procedure Component Value Units Date/Time    EKG 12-lead [[id number]] Collected: 07/30/25 0731    Order Status: Sent Lab Status: In process Updated: 07/30/25 1029     QRS Duration 94 ms      OHS QTC Calculation 413 ms     Narrative:      Test Reason : I49.9,    Vent. Rate :  75 BPM     Atrial Rate :  75 BPM     P-R Int : 154 ms          QRS Dur :  94 ms      QT Int  : 370 ms       P-R-T Axes :  50  76  34 degrees    QTcB Int : 413 ms    Normal sinus rhythm  Normal ECG  No previous ECGs available    Referred By: AAAREFERRAL SELF           Confirmed By:     EKG 12-lead [[id number]] Collected: 07/29/25 1827    Order Status: Sent Lab Status: In process Updated: 07/30/25 0506     QRS Duration 90 ms      OHS QTC Calculation 423 ms     Narrative:      Test Reason : I48.91,    Vent. Rate :  83 BPM     Atrial Rate :  83 BPM     P-R Int : 166 ms          QRS Dur :  90 ms      QT Int : 360 ms       P-R-T Axes :  55  72  33 degrees    QTcB Int : 423 ms    Normal sinus rhythm  Cannot rule out Anterior infarct ,age undetermined  Abnormal ECG  No previous ECGs available    Referred By: AAAREFERRAL SELF           Confirmed By:     EKG 12-lead [[id number]]     Order Status: Sent Lab Status: No result     EKG 12-lead [[id number]]     Order Status: Sent Lab Status: No result     HCV Virus Hold Specimen [[id number]] Collected: 07/28/25 2022    Order Status: Sent Lab Status: In process Updated: 07/28/25 2026    Specimen: Blood     HIV Virus Confirmation Hold Specimen [[id number]] Collected: 07/28/25 2022    Order Status: Sent Lab Status: In process Updated: 07/28/25 2026    Specimen: Blood            Medications:  Reconciled Home Medications:      Medication List        START taking these medications      apixaban 5 mg Tab  Commonly known as: ELIQUIS  Take 1 tablet (5 mg total) by mouth 2 (two) times daily.     flecainide 100 MG Tab  Commonly known as: TAMBOCOR  Take 1 tablet (100 mg total) by mouth every 12 (twelve) hours.     metoprolol tartrate 25 MG tablet  Commonly known as: LOPRESSOR  Take 0.5 tablets (12.5 mg total) by mouth 2 (two) times daily.            CONTINUE taking these medications      amLODIPine 5 MG tablet  Commonly known as: NORVASC  Take 1 tablet (5 mg total) by mouth once daily.     sertraline 25 MG tablet  Commonly known as: ZOLOFT  Take 1 tablet (25 mg total) by mouth once  daily.              Indwelling Lines/Drains at time of discharge:   Lines/Drains/Airways       None                       Time spent on the discharge of patient: 33 minutes         Noemí Bolden DO  Department of Hospital Medicine  [facility]

## 2025-07-30 NOTE — NURSING
IV Dc'd. Tolerated well. DC instructions, follow up appointments, and medications reviewed with patient. Verbalized understanding. Pt brought down with all belongings to family transport. Safety maintained.

## 2025-07-30 NOTE — PLAN OF CARE
Patient at bedside with significant other. Normal sinus rhythm noted; no gtt infusing. Patient preparing for discharge today. MD reviewed echocardiogram findings and discharge medications; patient verbalized understanding. Patient educated on home blood pressure monitoring. Cardiology follow-up requested.     07/30/25 0954   Rounds   Attendance Provider;Nurse ;Assigned nurse   Discharge Plan A Home   Why the patient remains in the hospital Requires continued medical care   Transition of Care Barriers None

## 2025-07-30 NOTE — ASSESSMENT & PLAN NOTE
Chronic, uncontrolled.  Latest blood pressure and vitals reviewed-     Temp:  [97.6 °F (36.4 °C)-98.5 °F (36.9 °C)]   Pulse:  [73-92]   Resp:  [14-24]   BP: (123-166)/(61-86)   SpO2:  [95 %-98 %] .   Home meds for hypertension were reviewed and noted below-  Hypertension Medications              amLODIPine (NORVASC) 5 MG tablet Take 1 tablet (5 mg total) by mouth once daily.            While in the hospital, will manage blood pressure as follows; Adjust home antihypertensive regimen as follows- holding home amlodipine while patient is on diltiazem gtt, resume once appropriate    Will utilize p.r.n. blood pressure medication only if patient's blood pressure greater than 180/110 and he develops symptoms such as worsening chest pain or shortness of breath.    - Patient does not seem to be compliant with home medications per med rec completed by pharmacy.

## 2025-07-31 ENCOUNTER — PATIENT OUTREACH (OUTPATIENT)
Facility: CLINIC | Age: 25
End: 2025-07-31
Payer: OTHER GOVERNMENT

## 2025-07-31 ENCOUNTER — PATIENT OUTREACH (OUTPATIENT)
Dept: FAMILY MEDICINE | Facility: CLINIC | Age: 25
End: 2025-07-31
Payer: OTHER GOVERNMENT

## 2025-07-31 LAB
OHS QRS DURATION: 90 MS
OHS QTC CALCULATION: 423 MS

## 2025-07-31 NOTE — TELEPHONE ENCOUNTER
Second attempt post hospital follow up discharge call. Left ms for pt for a return call to [phone].

## 2025-08-01 NOTE — TELEPHONE ENCOUNTER
Attempted to reach patient for tcc post hospital discharge follow up call and lef msg. For pt to return call.

## 2025-08-04 ENCOUNTER — TELEPHONE (OUTPATIENT)
Dept: FAMILY MEDICINE | Facility: CLINIC | Age: 25
End: 2025-08-04

## 2025-08-04 NOTE — TELEPHONE ENCOUNTER
Called pt to inform him we do not have any sooner appts and he will need to schedule a virtual to discuss his pysch medication. No answer, lvm and call back number.

## 2025-08-06 ENCOUNTER — OFFICE VISIT (OUTPATIENT)
Dept: FAMILY MEDICINE | Facility: CLINIC | Age: 25
End: 2025-08-06
Payer: OTHER GOVERNMENT

## 2025-08-06 ENCOUNTER — PATIENT MESSAGE (OUTPATIENT)
Dept: CARDIOLOGY | Facility: CLINIC | Age: 25
End: 2025-08-06
Payer: OTHER GOVERNMENT

## 2025-08-06 DIAGNOSIS — F41.9 ANXIETY: ICD-10-CM

## 2025-08-06 PROCEDURE — 98006 SYNCH AUDIO-VIDEO EST MOD 30: CPT | Mod: 95,,,

## 2025-08-06 RX ORDER — SERTRALINE HYDROCHLORIDE 50 MG/1
50 TABLET, FILM COATED ORAL DAILY
Qty: 30 TABLET | Refills: 1 | Status: SHIPPED | OUTPATIENT
Start: 2025-08-06

## 2025-08-06 RX ORDER — BUSPIRONE HYDROCHLORIDE 5 MG/1
5 TABLET ORAL 2 TIMES DAILY PRN
Qty: 60 TABLET | Refills: 1 | Status: SHIPPED | OUTPATIENT
Start: 2025-08-06

## 2025-08-06 NOTE — PROGRESS NOTES
Subjective:        The patient location is: Home in [place]  The chief complaint leading to consultation is: Anxiety    Visit type: audiovisual    Face to Face time with patient: 11  15 minutes of total time spent on the encounter, which includes face to face time and non-face to face time preparing to see the patient (eg, review of tests), Obtaining and/or reviewing separately obtained history, Documenting clinical information in the electronic or other health record, Independently interpreting results (not separately reported) and communicating results to the patient/family/caregiver, or Care coordination (not separately reported).     Each patient to whom he or she provides medical services by telemedicine is:  (1) informed of the relationship between the physician and patient and the respective role of any other health care provider with respect to management of the patient; and (2) notified that he or she may decline to receive medical services by telemedicine and may withdraw from such care at any time.    Mc is a 25-year-old male, who is an established patient.  He presents today virtually to discuss anxiety. He was recently in the hospital and diagnoses with PAFIB. He reports prior to the hospitalization he was noticing an increase in his anxiety that would consume him on certain days. He would find himself becoming hyper focused at times, which would also increase his anxiousness. He denies SI, HI, or self harm. He continues to take Zoloft 25mg daily. Tolerating well without side effects, but is not as effective.     Anxiety  Presents for follow-up visit. The problem has been gradually worsening. Symptoms include excessive worry, irritability, nervous/anxious behavior, obsessions, palpitations, panic and restlessness. Patient reports no chest pain, compulsions, confusion, decreased concentration, depressed mood, dizziness, dry mouth, dysphagia, dyspnea, feeling of choking, hyperventilation,  nausea, shortness of breath or suicidal ideas. Symptoms occur most days. The severity of symptoms is moderate.       History reviewed. No pertinent surgical history.  History reviewed. No pertinent past medical history.  No family history on file.     Social History:   Marital Status: Single  Alcohol History:  has no history on file for alcohol use.  Tobacco History:  reports that he has quit smoking. His smoking use included cigarettes. He does not have any smokeless tobacco history on file.  Drug History:  has no history on file for drug use.    Review of patient's allergies indicates:  No Known Allergies    Current Medications[1]    Review of Systems   Constitutional:  Positive for irritability. Negative for activity change and unexpected weight change.   HENT:  Negative for dental problem, drooling, ear discharge, hearing loss, rhinorrhea and trouble swallowing.    Eyes:  Negative for discharge and visual disturbance.   Respiratory:  Negative for chest tightness, shortness of breath and wheezing.    Cardiovascular:  Positive for palpitations. Negative for chest pain.   Gastrointestinal:  Negative for blood in stool, constipation, diarrhea and nausea.   Endocrine: Negative for polydipsia and polyuria.   Genitourinary:  Negative for difficulty urinating, hematuria and urgency.   Neurological:  Negative for dizziness.   Hematological:  Negative for adenopathy. Does not bruise/bleed easily.   Psychiatric/Behavioral:  Positive for sleep disturbance. Negative for agitation, behavioral problems, confusion, decreased concentration, dysphoric mood, hallucinations, self-injury and suicidal ideas. The patient is nervous/anxious. The patient is not hyperactive.          Objective:        Physical Exam:   Physical Exam  Constitutional:       General: He is not in acute distress.     Appearance: Normal appearance. He is normal weight. He is not ill-appearing.   Pulmonary:      Effort: Pulmonary effort is normal.    Neurological:      Mental Status: He is oriented to person, place, and time.   Psychiatric:         Mood and Affect: Mood is anxious.         Speech: Speech normal.         Behavior: Behavior normal.         Thought Content: Thought content normal. Thought content does not include homicidal or suicidal ideation. Thought content does not include homicidal or suicidal plan.         Cognition and Memory: Cognition normal.         Judgment: Judgment normal.              Assessment:       1. Anxiety      Plan:   Anxiety  Uncontrolled,Increase Zoloft to 50 mg daily.Start BuSpar 5 mg twice a day as needed for increased anxiety.  - Adjunctive treatment includes:  -Exercise 30 min daily- increases energy and wellbeing, reducing stress  -Obtain 7-8hr of sleep at night (avoid caffeine after lunch and watching TV late at night)  -Participate in activities to enhance interpersonal relationship and hobbies   -Consider seeking counseling to work through triggers and learning copping skills- will refer and provided resources when patient desires.  -Limit or reframe from smoking, alcohol and caffeine (all factors can increase anxiety)                -Use relaxation techniques. Visualization techniques, meditation, and yoga are examples of relaxation techniques that can ease anxiety.                                                                                                                                                                                                                                                                                                                                                                                                                                                                                                                                                                                                                                                                                                                                                                                                                                                                                                                                                                                                                                                                                                                                                                                                                                                                                                                                                                                                                                                                                    Advised patient to go to ER if having Suicidal or Homicidal Ideation.   -     sertraline (ZOLOFT) 50 MG tablet; Take 1 tablet (50 mg total) by mouth once daily.  Dispense: 30 tablet; Refill: 1  -     busPIRone (BUSPAR) 5 MG Tab; Take 1 tablet (5 mg total) by mouth 2 (two) times daily as needed (anxiety).  Dispense: 60 tablet; Refill: 1    Continue following up on August 18th for hospital follow up.   Follow up if symptoms worsen or fail to improve.    Total time spent with patient: 15    Each patient to whom he or she provides medical services by telemedicine is:  (1) informed of the relationship between the physician and patient and the respective role of any other health care provider with respect to management of the patient; and (2) notified that he or she may decline to receive medical services by telemedicine and may withdraw from such care at any time.             [1]   Current Outpatient Medications   Medication Sig Dispense Refill    amLODIPine (NORVASC) 5 MG tablet Take 1 tablet (5 mg total) by mouth once daily. 90 tablet 3    apixaban (ELIQUIS) 5 mg Tab Take 1 tablet (5 mg total) by mouth 2 (two) times daily. 60 tablet 2    flecainide (TAMBOCOR) 100 MG  Tab Take 1 tablet (100 mg total) by mouth every 12 (twelve) hours. 60 tablet 2    metoprolol tartrate (LOPRESSOR) 25 MG tablet Take 0.5 tablets (12.5 mg total) by mouth 2 (two) times daily. 30 tablet 2    busPIRone (BUSPAR) 5 MG Tab Take 1 tablet (5 mg total) by mouth 2 (two) times daily as needed (anxiety). 60 tablet 1    sertraline (ZOLOFT) 50 MG tablet Take 1 tablet (50 mg total) by mouth once daily. 30 tablet 1     No current facility-administered medications for this visit.

## 2025-08-13 ENCOUNTER — OFFICE VISIT (OUTPATIENT)
Dept: CARDIOLOGY | Facility: CLINIC | Age: 25
End: 2025-08-13
Payer: OTHER GOVERNMENT

## 2025-08-13 VITALS
SYSTOLIC BLOOD PRESSURE: 132 MMHG | BODY MASS INDEX: 30.19 KG/M2 | HEART RATE: 67 BPM | DIASTOLIC BLOOD PRESSURE: 78 MMHG | WEIGHT: 248 LBS | OXYGEN SATURATION: 98 %

## 2025-08-13 DIAGNOSIS — Z72.0 TOBACCO USE: ICD-10-CM

## 2025-08-13 DIAGNOSIS — Z76.89 ENCOUNTER TO ESTABLISH CARE: ICD-10-CM

## 2025-08-13 DIAGNOSIS — I48.0 PAF (PAROXYSMAL ATRIAL FIBRILLATION): Primary | ICD-10-CM

## 2025-08-13 DIAGNOSIS — I10 PRIMARY HYPERTENSION: ICD-10-CM

## 2025-08-13 LAB
OHS QRS DURATION: 96 MS
OHS QTC CALCULATION: 392 MS

## 2025-08-13 PROCEDURE — 99999 PR PBB SHADOW E&M-EST. PATIENT-LVL III: CPT | Mod: PBBFAC,,, | Performed by: GENERAL PRACTICE

## 2025-08-13 PROCEDURE — 99213 OFFICE O/P EST LOW 20 MIN: CPT | Mod: PBBFAC,PN | Performed by: GENERAL PRACTICE

## 2025-08-13 PROCEDURE — 99214 OFFICE O/P EST MOD 30 MIN: CPT | Mod: S$PBB,,, | Performed by: GENERAL PRACTICE

## 2025-08-13 RX ORDER — METOPROLOL TARTRATE 25 MG/1
12.5 TABLET, FILM COATED ORAL 2 TIMES DAILY
Qty: 90 TABLET | Refills: 3 | Status: SHIPPED | OUTPATIENT
Start: 2025-08-13

## 2025-08-13 RX ORDER — AMLODIPINE BESYLATE 5 MG/1
10 TABLET ORAL DAILY
Qty: 90 TABLET | Refills: 3 | Status: SHIPPED | OUTPATIENT
Start: 2025-08-13

## 2025-08-13 RX ORDER — FLECAINIDE ACETATE 50 MG/1
100 TABLET ORAL EVERY 12 HOURS
Qty: 120 TABLET | Refills: 11 | Status: SHIPPED | OUTPATIENT
Start: 2025-08-13 | End: 2026-08-13

## 2025-08-19 ENCOUNTER — TELEPHONE (OUTPATIENT)
Dept: CARDIOLOGY | Facility: HOSPITAL | Age: 25
End: 2025-08-19